# Patient Record
Sex: MALE | Race: WHITE | Employment: FULL TIME | URBAN - METROPOLITAN AREA
[De-identification: names, ages, dates, MRNs, and addresses within clinical notes are randomized per-mention and may not be internally consistent; named-entity substitution may affect disease eponyms.]

---

## 2017-01-02 ENCOUNTER — OFFICE VISIT (OUTPATIENT)
Dept: PHYSICAL THERAPY | Age: 47
End: 2017-01-02
Attending: INTERNAL MEDICINE
Payer: COMMERCIAL

## 2017-01-02 PROCEDURE — 97140 MANUAL THERAPY 1/> REGIONS: CPT

## 2017-01-02 PROCEDURE — 97110 THERAPEUTIC EXERCISES: CPT

## 2017-01-02 NOTE — PROGRESS NOTES
Dx: Myalgia (M79.1)           Authorized # of Visits:  8         Next MD visit: Jan 12  Fall Risk: standard         Precautions: n/a              Subjective: Reports increased muscle soreness all over past few days.   \"Feels like I was hit by a truck\"  Re stretch 3x 3x Wall reverse T 5x2 Piriformis, SKTC 3x each Full body extension stretch     Manual STM bilat UT, levator SNAGS for rot.  Manual  STM  UT release   Doorway stretch \"W\" and \"Y\" positions Ham 90/90 /neural flossing x10 Cervical manual distrac

## 2017-01-04 ENCOUNTER — OFFICE VISIT (OUTPATIENT)
Dept: PHYSICAL THERAPY | Age: 47
End: 2017-01-04
Attending: INTERNAL MEDICINE
Payer: COMMERCIAL

## 2017-01-04 PROCEDURE — 97110 THERAPEUTIC EXERCISES: CPT

## 2017-01-04 PROCEDURE — 97140 MANUAL THERAPY 1/> REGIONS: CPT

## 2017-01-05 NOTE — PROGRESS NOTES
Dx: Myalgia (M79.1)           Authorized # of Visits:  8         Next MD visit: Jan 12  Fall Risk: standard         Precautions: n/a              Subjective: Muscle soreness has been better since last visit.   Reports doing his stretches last night includin UT, levator SNAGS for rot. Manual  STM  UT release   Doorway stretch \"W\" and \"Y\" positions Ham 90/90 /neural flossing x10 Cervical manual distraction 5' int  -UT and rotation manual stretch Piriformis, trunk rot stretch 3x each.     4 point thoracic rot

## 2017-01-11 ENCOUNTER — OFFICE VISIT (OUTPATIENT)
Dept: PHYSICAL THERAPY | Age: 47
End: 2017-01-11
Attending: INTERNAL MEDICINE
Payer: COMMERCIAL

## 2017-01-11 PROCEDURE — 97110 THERAPEUTIC EXERCISES: CPT

## 2017-01-11 PROCEDURE — 97140 MANUAL THERAPY 1/> REGIONS: CPT

## 2017-01-11 NOTE — PROGRESS NOTES
Dx: Myalgia (M79.1)           Authorized # of Visits:  8         Next MD visit: Jan 12  Fall Risk: standard         Precautions: n/a            Progress /Discharge Summary    Pt has attended 8, cancelled 0, and no shown 0 visits in Physical Therapy.      Martinez turning. --> met but not consistently  Able to tolerate 45 min drive to work without stopping and able to immediately transfer from car upon arrival at work. --> in progress  Able to sleep through the night not waking more than 1x due to pain complaints. rotation with distraction Pt ed NS sensitivity, aerobic component and stretching program.  Lumbar rot mobe grade III 45\"x4 each side Prone lumbar PA's grade I,II  STM lumbar paraspinals Pelvic tilt- incr to 5 sec holds x15 HP cervical, T-L x 15'   Prayer

## 2017-02-02 PROBLEM — I10 ESSENTIAL HYPERTENSION: Status: ACTIVE | Noted: 2017-02-02

## 2017-02-08 PROBLEM — M62.838 SPASM OF MUSCLE: Status: ACTIVE | Noted: 2017-02-08

## 2017-02-08 PROBLEM — L90.5 SCAR CONDITION AND FIBROSIS OF SKIN: Status: ACTIVE | Noted: 2017-02-08

## 2017-02-20 PROBLEM — M51.26 PROTRUSION OF LUMBAR INTERVERTEBRAL DISC: Status: ACTIVE | Noted: 2017-02-20

## 2017-02-20 PROBLEM — M70.72: Status: ACTIVE | Noted: 2017-02-20

## 2017-02-20 PROCEDURE — 84153 ASSAY OF PSA TOTAL: CPT | Performed by: INTERNAL MEDICINE

## 2017-02-20 PROCEDURE — 36415 COLL VENOUS BLD VENIPUNCTURE: CPT | Performed by: INTERNAL MEDICINE

## 2017-02-20 PROCEDURE — 83550 IRON BINDING TEST: CPT | Performed by: INTERNAL MEDICINE

## 2017-02-20 PROCEDURE — 80050 GENERAL HEALTH PANEL: CPT | Performed by: INTERNAL MEDICINE

## 2017-02-20 PROCEDURE — 82728 ASSAY OF FERRITIN: CPT | Performed by: INTERNAL MEDICINE

## 2017-02-20 PROCEDURE — 83540 ASSAY OF IRON: CPT | Performed by: INTERNAL MEDICINE

## 2017-02-20 PROCEDURE — 80061 LIPID PANEL: CPT | Performed by: INTERNAL MEDICINE

## 2017-04-18 PROCEDURE — 88304 TISSUE EXAM BY PATHOLOGIST: CPT | Performed by: UROLOGY

## 2017-04-18 PROCEDURE — 88302 TISSUE EXAM BY PATHOLOGIST: CPT | Performed by: UROLOGY

## 2017-05-30 PROCEDURE — 84403 ASSAY OF TOTAL TESTOSTERONE: CPT | Performed by: PHYSICIAN ASSISTANT

## 2017-05-30 PROCEDURE — 36415 COLL VENOUS BLD VENIPUNCTURE: CPT | Performed by: PHYSICIAN ASSISTANT

## 2017-06-05 PROBLEM — M47.816 LUMBAR SPONDYLOSIS: Status: ACTIVE | Noted: 2017-06-05

## 2017-06-05 PROBLEM — M47.816 FACET ARTHROPATHY, LUMBAR: Status: ACTIVE | Noted: 2017-06-05

## 2017-06-28 PROCEDURE — 88305 TISSUE EXAM BY PATHOLOGIST: CPT | Performed by: UROLOGY

## 2017-06-28 PROCEDURE — 88312 SPECIAL STAINS GROUP 1: CPT | Performed by: UROLOGY

## 2017-07-27 PROBLEM — R53.83 FATIGUE: Status: ACTIVE | Noted: 2017-07-27

## 2017-07-27 PROCEDURE — 84403 ASSAY OF TOTAL TESTOSTERONE: CPT | Performed by: UROLOGY

## 2017-07-27 PROCEDURE — 36415 COLL VENOUS BLD VENIPUNCTURE: CPT | Performed by: UROLOGY

## 2017-08-02 PROBLEM — M79.18 MYOFASCIAL MUSCLE PAIN: Status: ACTIVE | Noted: 2017-08-02

## 2017-09-07 PROBLEM — M79.621 PAIN IN BOTH UPPER ARMS: Status: ACTIVE | Noted: 2017-09-07

## 2017-09-07 PROBLEM — M79.622 PAIN IN BOTH UPPER ARMS: Status: ACTIVE | Noted: 2017-09-07

## 2017-10-14 PROCEDURE — 84403 ASSAY OF TOTAL TESTOSTERONE: CPT | Performed by: PHYSICIAN ASSISTANT

## 2017-12-28 PROBLEM — M79.10 MYALGIA: Status: ACTIVE | Noted: 2017-12-28

## 2018-01-05 PROBLEM — R20.0 ARM NUMBNESS: Status: ACTIVE | Noted: 2018-01-05

## 2018-01-26 PROCEDURE — 81003 URINALYSIS AUTO W/O SCOPE: CPT | Performed by: INTERNAL MEDICINE

## 2018-01-26 PROCEDURE — 82607 VITAMIN B-12: CPT | Performed by: INTERNAL MEDICINE

## 2018-01-26 PROCEDURE — 84403 ASSAY OF TOTAL TESTOSTERONE: CPT | Performed by: UROLOGY

## 2018-02-12 PROBLEM — E66.811 OBESITY (BMI 30.0-34.9): Status: ACTIVE | Noted: 2018-02-12

## 2018-02-12 PROBLEM — E66.9 OBESITY (BMI 30.0-34.9): Status: ACTIVE | Noted: 2018-02-12

## 2018-04-03 PROCEDURE — 87086 URINE CULTURE/COLONY COUNT: CPT | Performed by: PHYSICIAN ASSISTANT

## 2018-04-06 ENCOUNTER — HOSPITAL ENCOUNTER (EMERGENCY)
Age: 48
Discharge: HOME | End: 2018-04-06
Attending: EMERGENCY MEDICINE
Payer: COMMERCIAL

## 2018-04-06 ENCOUNTER — APPOINTMENT (OUTPATIENT)
Dept: ULTRASOUND IMAGING | Age: 48
End: 2018-04-06
Attending: EMERGENCY MEDICINE
Payer: COMMERCIAL

## 2018-04-06 VITALS
OXYGEN SATURATION: 96 % | BODY MASS INDEX: 29.02 KG/M2 | RESPIRATION RATE: 18 BRPM | DIASTOLIC BLOOD PRESSURE: 57 MMHG | HEIGHT: 64 IN | SYSTOLIC BLOOD PRESSURE: 109 MMHG | HEART RATE: 101 BPM | WEIGHT: 170 LBS | TEMPERATURE: 98 F

## 2018-04-06 DIAGNOSIS — M79.604 LEG PAIN, ANTERIOR, RIGHT: Primary | ICD-10-CM

## 2018-04-06 PROCEDURE — 96375 TX/PRO/DX INJ NEW DRUG ADDON: CPT

## 2018-04-06 PROCEDURE — 99284 EMERGENCY DEPT VISIT MOD MDM: CPT

## 2018-04-06 PROCEDURE — 76882 US LMTD JT/FCL EVL NVASC XTR: CPT | Performed by: EMERGENCY MEDICINE

## 2018-04-06 PROCEDURE — 85610 PROTHROMBIN TIME: CPT | Performed by: EMERGENCY MEDICINE

## 2018-04-06 PROCEDURE — 85730 THROMBOPLASTIN TIME PARTIAL: CPT | Performed by: EMERGENCY MEDICINE

## 2018-04-06 PROCEDURE — 96374 THER/PROPH/DIAG INJ IV PUSH: CPT

## 2018-04-06 PROCEDURE — 85025 COMPLETE CBC W/AUTO DIFF WBC: CPT | Performed by: EMERGENCY MEDICINE

## 2018-04-06 PROCEDURE — 80053 COMPREHEN METABOLIC PANEL: CPT | Performed by: EMERGENCY MEDICINE

## 2018-04-06 RX ORDER — ETODOLAC 400 MG/1
400 TABLET, FILM COATED ORAL 3 TIMES DAILY PRN
Qty: 30 TABLET | Refills: 0 | Status: SHIPPED | OUTPATIENT
Start: 2018-04-06 | End: 2018-09-18 | Stop reason: ALTCHOICE

## 2018-04-06 RX ORDER — METHYLPREDNISOLONE 4 MG/1
TABLET ORAL
Qty: 1 PACKAGE | Refills: 0 | Status: SHIPPED | OUTPATIENT
Start: 2018-04-06 | End: 2018-04-11

## 2018-04-06 RX ORDER — LORAZEPAM 2 MG/ML
1 INJECTION INTRAMUSCULAR ONCE
Status: COMPLETED | OUTPATIENT
Start: 2018-04-06 | End: 2018-04-06

## 2018-04-06 RX ORDER — LIDOCAINE 50 MG/G
2 PATCH TOPICAL EVERY 24 HOURS
Qty: 30 PATCH | Refills: 0 | Status: SHIPPED | OUTPATIENT
Start: 2018-04-06 | End: 2018-09-18

## 2018-04-06 RX ORDER — KETOROLAC TROMETHAMINE 30 MG/ML
30 INJECTION, SOLUTION INTRAMUSCULAR; INTRAVENOUS ONCE
Status: COMPLETED | OUTPATIENT
Start: 2018-04-06 | End: 2018-04-06

## 2018-04-06 RX ORDER — ONDANSETRON 2 MG/ML
4 INJECTION INTRAMUSCULAR; INTRAVENOUS ONCE
Status: COMPLETED | OUTPATIENT
Start: 2018-04-06 | End: 2018-04-06

## 2018-04-06 RX ORDER — MORPHINE SULFATE 4 MG/ML
4 INJECTION, SOLUTION INTRAMUSCULAR; INTRAVENOUS ONCE
Status: COMPLETED | OUTPATIENT
Start: 2018-04-06 | End: 2018-04-06

## 2018-04-07 NOTE — ED INITIAL ASSESSMENT (HPI)
Patient states that he gave himself a shot of testosterone on Monday and is having pain and swelling to right upper thigh area.

## 2018-04-07 NOTE — ED NOTES
After receiving the medications, patient is unable to tolerate crutch walking. Patient states that he has had several knee procedures that he knows how to use crutches. Patient given a pair of crutches adjusted to his height.

## 2018-04-07 NOTE — ED PROVIDER NOTES
Patient Seen in: [facility] Emergency Department In [place]    History   Patient presents with:  Cellulitis (integumentary, infectious)    Stated Complaint: PAIN AND SWELLING TO RIGHT THIGH - STATES HAD AN \"SHOT\" ON MONDAY    HPI    28-year-old male pre 2015:  OTHER SURGICAL HISTORY      Comment: S/ P Radical left orchiectomy 2015 06/28/2017: OTHER SURGICAL HISTORY      Comment: Radical R orchiectomy 6/28/17  No date: REPAIR ING HERNIA,5+Y/O,REDUCIBL      Comment: s/p  ventral hernia repair  12/22/2016: Cardiovascular: Normal rate, regular rhythm, normal heart sounds and intact distal pulses. Pulmonary/Chest: Effort normal and breath sounds normal. No stridor. Musculoskeletal: He exhibits tenderness.         Legs:  Lymphadenopathy:     He has no cervi Patient IV established and baseline blood work obtained. He underwent an ultrasound. No abnormality was appreciated. He does not have an elevated white count. He was given Toradol and Ativan.   He states he had no improvement whatsoever although he did

## 2018-05-11 PROCEDURE — 84403 ASSAY OF TOTAL TESTOSTERONE: CPT | Performed by: PHYSICIAN ASSISTANT

## 2018-06-27 PROBLEM — M70.61 TROCHANTERIC BURSITIS OF RIGHT HIP: Status: ACTIVE | Noted: 2018-06-27

## 2018-06-27 PROBLEM — M75.51 BURSITIS OF RIGHT SHOULDER: Status: ACTIVE | Noted: 2018-06-27

## 2018-07-12 ENCOUNTER — OFFICE VISIT (OUTPATIENT)
Dept: PHYSICAL THERAPY | Age: 48
End: 2018-07-12
Attending: PHYSICAL MEDICINE & REHABILITATION
Payer: COMMERCIAL

## 2018-07-12 DIAGNOSIS — M79.10 MYALGIA: ICD-10-CM

## 2018-07-12 PROCEDURE — 97163 PT EVAL HIGH COMPLEX 45 MIN: CPT

## 2018-07-12 PROCEDURE — 97112 NEUROMUSCULAR REEDUCATION: CPT

## 2018-07-12 NOTE — PROGRESS NOTES
SPINE EVALUATION:   Referring Physician: Dr. Denise Garay  Diagnosis: Myalgia (M79.1)     Date of Service: 7/10/2018     PATIENT Mandi Woodard is a 51year old y/o male who presents to therapy today with complaints of tightness in UE and LE.  He include increase activity, flexibility, and decrease tension. Past medical history was reviewed with Prem.  Significant findings include depression, sleep apnea, anxiety, IBS, chronic pelvic pain, surgery x 2 L knee, abdominal hernia, radiofrequency ablat pain on right side    UE ROM: 140-140 flexion limited by pain in upper thoracic, 150 shoulder abd in pectorals  LE ROM: R hip flexion 105; L 110, R hip ER 75% pain at lateral hip, B hip IR pain at pubic bone with R restricted 75% ROm    Accessory motion: T Activity;  Electrical Stim;  Pt education; Home exercise program instruction    Education or treatment limitation: None  Rehab Potential:good    FOTO: 42/100      Patient was advised of these findings, precautions, and treatment options and has agreed to Herbert Monterroso

## 2018-07-17 ENCOUNTER — OFFICE VISIT (OUTPATIENT)
Dept: PHYSICAL THERAPY | Age: 48
End: 2018-07-17
Attending: PHYSICAL MEDICINE & REHABILITATION
Payer: COMMERCIAL

## 2018-07-17 DIAGNOSIS — M79.10 MYALGIA: ICD-10-CM

## 2018-07-17 PROCEDURE — 97110 THERAPEUTIC EXERCISES: CPT

## 2018-07-17 PROCEDURE — 97112 NEUROMUSCULAR REEDUCATION: CPT

## 2018-07-17 PROCEDURE — 97140 MANUAL THERAPY 1/> REGIONS: CPT

## 2018-07-17 NOTE — PROGRESS NOTES
Dx: Myalgia (M79.1)          Authorized # of Visits:  10 visits         Next MD visit: none scheduled  Fall Risk: standard         Precautions: n/a             Subjective: Patient report that he commonly gets spasms in middle of back.  Patient reports he is Patient will report full cervical ROM without pain limitation  Patient will be able to sit at ergonomic work desk with computer with 0-2/10 pain  Patient will demonstrate increase hip extension 15 degrees to improve femoral nerve mobility with gait.   Mayra Charges: Ray Hammador 15 in), Ex2 ( 25 min), MT ( 10 min)       Total Timed Treatment: 50 min  Total Treatment Time: 50 min       SPINE EVALUATION:   Referring Physician: Dr. Sydnee Farah  Diagnosis: Myalgia (M79.1)     Date of Service: 7/10/2018     PATIENT SUMMARY Current functional limitations include 40 minute at walk a day. PLF: Use to run, [employer] volleball and basketball  Pt goals include increase activity, flexibility, and decrease tension. Past medical history was reviewed with Prem.  Significant find Cervical ROM:   Flexion: WNL  Extension: WNL  Sidebending: R 30; L 30 contralateral pain with both motions  Rotation: R 100; L 50 pain on right side    UE ROM: 140-140 flexion limited by pain in upper thoracic, 150 shoulder abd in pectorals  LE ROM: R hip Frequency / Duration: Patient will be seen for 2 x/week or a total of 10 visits over a 90 day period. Treatment will include: Manual Therapy; Therapeutic Exercises; Neuromuscular Re-education; Therapeutic Activity;  Electrical Stim;  Pt education; Home exer

## 2018-07-19 ENCOUNTER — OFFICE VISIT (OUTPATIENT)
Dept: PHYSICAL THERAPY | Age: 48
End: 2018-07-19
Attending: PHYSICAL MEDICINE & REHABILITATION
Payer: COMMERCIAL

## 2018-07-19 DIAGNOSIS — M79.10 MYALGIA: ICD-10-CM

## 2018-07-19 PROCEDURE — 97110 THERAPEUTIC EXERCISES: CPT

## 2018-07-19 PROCEDURE — 97140 MANUAL THERAPY 1/> REGIONS: CPT

## 2018-07-19 NOTE — PROGRESS NOTES
Dx: Myalgia (M79.1)          Authorized # of Visits:  10 visits         Next MD visit: none scheduled  Fall Risk: standard         Precautions: n/a           15 minutes late for appointment  Subjective: Patient reports no back spasm today.  He wants to addr Upper thoracic and ribs PA GIV L>R restriction        Cervical accessory assessment    GIV down glides C6/7 - C7/T1         PA middle thoracic and ribs GIV - V  Upper thoracic and ribs PA GIV L>R restriction Manual UT and LS stretch 30 sec x 5        Manua Ramonita Faria is a 51year old y/o male who presents to therapy today with complaints of tightness in UE and LE. He reports he has had a long history of chronic pain that started after inguinal hernia repair.  He began to have severe testicular pain that Past medical history was reviewed with Prem.  Significant findings include depression, sleep apnea, anxiety, IBS, chronic pelvic pain, surgery x 2 L knee, abdominal hernia, radiofrequency ablation L5/S1 x2  Last one was in June, B orchiectomy, Iguinal pramod UE ROM: 140-140 flexion limited by pain in upper thoracic, 150 shoulder abd in pectorals  LE ROM: R hip flexion 105; L 110, R hip ER 75% pain at lateral hip, B hip IR pain at pubic bone with R restricted 75% ROm    Accessory motion:  To be tested  Palpation Frequency / Duration: Patient will be seen for 2 x/week or a total of 10 visits over a 90 day period. Treatment will include: Manual Therapy; Therapeutic Exercises; Neuromuscular Re-education; Therapeutic Activity;  Electrical Stim;  Pt education; Home exer

## 2018-07-31 ENCOUNTER — OFFICE VISIT (OUTPATIENT)
Dept: PHYSICAL THERAPY | Age: 48
End: 2018-07-31
Attending: PHYSICAL MEDICINE & REHABILITATION
Payer: COMMERCIAL

## 2018-07-31 DIAGNOSIS — M79.10 MYALGIA: ICD-10-CM

## 2018-07-31 PROCEDURE — 97140 MANUAL THERAPY 1/> REGIONS: CPT

## 2018-07-31 PROCEDURE — 97110 THERAPEUTIC EXERCISES: CPT

## 2018-07-31 NOTE — PROGRESS NOTES
Dx: Myalgia (M79.1)          Authorized # of Visits:  10 visits         Next MD visit: none scheduled  Fall Risk: standard         Precautions: n/a           15 minutes late for appointment  Subjective: Return from one week of vacation.  Remained active at Upper thoracic and ribs PA GIV L>R restriction Hip flexor stretch and 30 sec x 3 B       Cervical accessory assessment    GIV down glides C6/7 - C7/T1         PA middle thoracic and ribs GIV - V  Upper thoracic and ribs PA GIV L>R restriction Manual UT and Sabino Blanton is a 51year old y/o male who presents to therapy today with complaints of tightness in UE and LE. He reports he has had a long history of chronic pain that started after inguinal hernia repair.  He began to have severe testicular pain that Past medical history was reviewed with Prem.  Significant findings include depression, sleep apnea, anxiety, IBS, chronic pelvic pain, surgery x 2 L knee, abdominal hernia, radiofrequency ablation L5/S1 x2  Last one was in June, B orchiectomy, Iguinal pramod UE ROM: 140-140 flexion limited by pain in upper thoracic, 150 shoulder abd in pectorals  LE ROM: R hip flexion 105; L 110, R hip ER 75% pain at lateral hip, B hip IR pain at pubic bone with R restricted 75% ROm    Accessory motion:  To be tested  Palpation Frequency / Duration: Patient will be seen for 2 x/week or a total of 10 visits over a 90 day period. Treatment will include: Manual Therapy; Therapeutic Exercises; Neuromuscular Re-education; Therapeutic Activity;  Electrical Stim;  Pt education; Home exer

## 2018-08-02 ENCOUNTER — OFFICE VISIT (OUTPATIENT)
Dept: PHYSICAL THERAPY | Age: 48
End: 2018-08-02
Attending: PHYSICAL MEDICINE & REHABILITATION
Payer: COMMERCIAL

## 2018-08-02 DIAGNOSIS — M79.10 MYALGIA: ICD-10-CM

## 2018-08-02 PROCEDURE — 97140 MANUAL THERAPY 1/> REGIONS: CPT

## 2018-08-02 PROCEDURE — 97110 THERAPEUTIC EXERCISES: CPT

## 2018-08-02 NOTE — PROGRESS NOTES
Dx: Myalgia (M79.1)          Authorized # of Visits:  10 visits         Next MD visit: none scheduled  Fall Risk: standard         Precautions: n/a           15 minutes late for appointment  Subjective: Patient reports that he is trying to remain active wi Patient will report 50% less pelvic and gluteal pain after 30 minutes of standing    Plan: Continue PT to increase cervical ROM and thoracic mobility. Provide stretches to help patient receive \"tension\" symptom relief.  Address nerve glides and LE stretch Pt has calm sharon.  Discuss importance on addressing mindfulness to help with sleep and FMS goals Supine PNF D2 green 2 x 10 B  Sidelying stretch PB 30 sec x 3  Address QL      TNE  [facility] pain center also regulates sleep and emotion  Discuss the central role in Currently, he is most concerned about tightness and tension he experiences through pectorals, neck, TMJ, and piriformis. He recently received injection in R shoulder and hip for bursitis and has improved ROM with less pain.  Patient also had reduction of pa Mr. Sunday Felipe has a long history of chronic pain and centralized patterns. He has sleep disturbance, frequent pain sources and manages depression which correlated with FMS symptoms he is experiencing.  He has benefited from PT and stretches in the past. He l Rhomboids: R 3+/5, L 3+/5  Mid trap: R 3+/5; L 3+/5  Lats: R 4/5, L 4/5  Low trap: R 3/5; L 3/5   4+-5/5 B       Flexibility:   UE/Scapular LE   Upper Trap: R restricted; L restricted  Levator Scap: R restricted; L restricted  Pec Major:   WNL with tightnes Thank you for your referral. Please co-sign or sign and return this letter via fax as soon as possible to [phone].  If you have any questions, please contact me at Dept: [phone]    Sincerely,  Electronically signed by therapist: Malcom Murray PT

## 2018-08-03 PROCEDURE — 84403 ASSAY OF TOTAL TESTOSTERONE: CPT | Performed by: PHYSICIAN ASSISTANT

## 2018-08-03 PROCEDURE — 36415 COLL VENOUS BLD VENIPUNCTURE: CPT | Performed by: PHYSICIAN ASSISTANT

## 2018-08-03 PROCEDURE — 84402 ASSAY OF FREE TESTOSTERONE: CPT | Performed by: PHYSICIAN ASSISTANT

## 2018-08-07 ENCOUNTER — TELEPHONE (OUTPATIENT)
Dept: PHYSICAL THERAPY | Age: 48
End: 2018-08-07

## 2018-08-07 ENCOUNTER — APPOINTMENT (OUTPATIENT)
Dept: PHYSICAL THERAPY | Age: 48
End: 2018-08-07
Attending: PHYSICAL MEDICINE & REHABILITATION
Payer: COMMERCIAL

## 2018-08-09 ENCOUNTER — OFFICE VISIT (OUTPATIENT)
Dept: PHYSICAL THERAPY | Age: 48
End: 2018-08-09
Attending: PHYSICAL MEDICINE & REHABILITATION
Payer: COMMERCIAL

## 2018-08-09 PROCEDURE — 97110 THERAPEUTIC EXERCISES: CPT

## 2018-08-09 PROCEDURE — 97140 MANUAL THERAPY 1/> REGIONS: CPT

## 2018-08-09 NOTE — PROGRESS NOTES
Dx: Myalgia (M79.1)          Authorized # of Visits:  10 visits         Next MD visit: none scheduled  Fall Risk: standard         Precautions: n/a           15 minutes late for appointment  Subjective: Patient some \"thoracic/lumbar region\" pain after si Side stepping 2 min B with posture cues    Backward walking 3 min 0.9 mph for nerve glide     Thoracic half roll 5 min with pectoral stretch  Advise patient to give pectorals a break and no longer than 30 sec  PA middle thoracic and ribs GIV - V  Upper tho Skilled Services: To provide skilled guidance of therapeutic exercise for both stretching and strengthening combined with TNE and resources for managing sleep and emotion to help with pain.  To address nerve glides and hip flexor mobility to assist with pel Currently, he is most concerned about tightness and tension he experiences through pectorals, neck, TMJ, and piriformis. He recently received injection in R shoulder and hip for bursitis and has improved ROM with less pain.  Patient also had reduction of pa Mr. Stacey Gamboa has a long history of chronic pain and centralized patterns. He has sleep disturbance, frequent pain sources and manages depression which correlated with FMS symptoms he is experiencing.  He has benefited from PT and stretches in the past. He l Rhomboids: R 3+/5, L 3+/5  Mid trap: R 3+/5; L 3+/5  Lats: R 4/5, L 4/5  Low trap: R 3/5; L 3/5   4+-5/5 B       Flexibility:   UE/Scapular LE   Upper Trap: R restricted; L restricted  Levator Scap: R restricted; L restricted  Pec Major:   WNL with tightnes Thank you for your referral. Please co-sign or sign and return this letter via fax as soon as possible to [phone].  If you have any questions, please contact me at Dept: [phone]    Sincerely,  Electronically signed by therapist: Malcom Murray PT

## 2018-08-13 ENCOUNTER — OFFICE VISIT (OUTPATIENT)
Dept: PHYSICAL THERAPY | Age: 48
End: 2018-08-13
Attending: INTERNAL MEDICINE
Payer: COMMERCIAL

## 2018-08-13 PROCEDURE — 97140 MANUAL THERAPY 1/> REGIONS: CPT

## 2018-08-13 PROCEDURE — 97110 THERAPEUTIC EXERCISES: CPT

## 2018-08-13 NOTE — PROGRESS NOTES
Dx: Myalgia (M79.1)          Authorized # of Visits:  10 visits         Next MD visit: none scheduled  Fall Risk: standard         Precautions: n/a             Subjective: Neck and upper back has improved at closer to 0/10.  Pelvis, pubic bone and piriformi Discuss past HEP from [facility]. Hip flexor stretch and 30 sec x 3 B TM 5 min with arm swing 1.8 mph    Review subjective hx. Review HEP TM 5 min with arm swing 2.0 mph      Review subjective hx.    Review HEP TM 2.0 3 min  Side stepping 2 min B with posture Pt has calm sharon.  Discuss importance on addressing mindfulness to help with sleep and FMS goals Supine PNF D2 green 2 x 10 B  Sidelying stretch PB 30 sec x 3  Address QL  Instruct self stretch for home    TNE  [facility] pain center also regulates sleep and emotio Currently, he is most concerned about tightness and tension he experiences through pectorals, neck, TMJ, and piriformis. He recently received injection in R shoulder and hip for bursitis and has improved ROM with less pain.  Patient also had reduction of pa Mr. Akash Camarena has a long history of chronic pain and centralized patterns. He has sleep disturbance, frequent pain sources and manages depression which correlated with FMS symptoms he is experiencing.  He has benefited from PT and stretches in the past. He l Rhomboids: R 3+/5, L 3+/5  Mid trap: R 3+/5; L 3+/5  Lats: R 4/5, L 4/5  Low trap: R 3/5; L 3/5   4+-5/5 B       Flexibility:   UE/Scapular LE   Upper Trap: R restricted; L restricted  Levator Scap: R restricted; L restricted  Pec Major:   WNL with tightnes Thank you for your referral. Please co-sign or sign and return this letter via fax as soon as possible to [phone].  If you have any questions, please contact me at Dept: [phone]    Sincerely,  Electronically signed by therapist: Cricket Welch PT

## 2018-08-14 ENCOUNTER — APPOINTMENT (OUTPATIENT)
Dept: PHYSICAL THERAPY | Age: 48
End: 2018-08-14
Attending: PHYSICAL MEDICINE & REHABILITATION
Payer: COMMERCIAL

## 2018-08-16 ENCOUNTER — OFFICE VISIT (OUTPATIENT)
Dept: PHYSICAL THERAPY | Age: 48
End: 2018-08-16
Attending: PHYSICAL MEDICINE & REHABILITATION
Payer: COMMERCIAL

## 2018-08-16 PROCEDURE — 97110 THERAPEUTIC EXERCISES: CPT

## 2018-08-16 PROCEDURE — 97140 MANUAL THERAPY 1/> REGIONS: CPT

## 2018-08-16 NOTE — PROGRESS NOTES
Dx: Myalgia (M79.1)          Authorized # of Visits:  10 visits         Next MD visit: none scheduled  Fall Risk: standard         Precautions: n/a             Subjective: Neck stiffness on left side.  Patient thinks it is from turning head frequently to th Discuss past HEP from [facility]. Hip flexor stretch and 30 sec x 3 B TM 5 min with arm swing 1.8 mph    Review subjective hx. Review HEP TM 5 min with arm swing 2.0 mph      Review subjective hx.    Review HEP TM 2.0 3 min  Side stepping 2 min B with posture Instruct self stretch Supine horizontal abd red 2 x 10      PB  \"t\" 2 x 10  \"Y\" 2 x 10  Lumbar ROLL L5/S1 release with positioning R and L KT tape UT inhibition and posture cue tape.     TNE  Provide information about community talk for Mindfulness with Abdon Jorge is a 51year old y/o male who presents to therapy today with complaints of tightness in UE and LE. He reports he has had a long history of chronic pain that started after inguinal hernia repair.  He began to have severe testicular pain that Past medical history was reviewed with Prem.  Significant findings include depression, sleep apnea, anxiety, IBS, chronic pelvic pain, surgery x 2 L knee, abdominal hernia, radiofrequency ablation L5/S1 x2  Last one was in June, B orchiectomy, Iguinal pramod UE ROM: 140-140 flexion limited by pain in upper thoracic, 150 shoulder abd in pectorals  LE ROM: R hip flexion 105; L 110, R hip ER 75% pain at lateral hip, B hip IR pain at pubic bone with R restricted 75% ROm    Accessory motion:  To be tested  Palpation Frequency / Duration: Patient will be seen for 2 x/week or a total of 10 visits over a 90 day period. Treatment will include: Manual Therapy; Therapeutic Exercises; Neuromuscular Re-education; Therapeutic Activity;  Electrical Stim;  Pt education; Home exer

## 2018-08-21 ENCOUNTER — OFFICE VISIT (OUTPATIENT)
Dept: PHYSICAL THERAPY | Age: 48
End: 2018-08-21
Attending: PHYSICAL MEDICINE & REHABILITATION
Payer: COMMERCIAL

## 2018-08-21 PROCEDURE — 97110 THERAPEUTIC EXERCISES: CPT

## 2018-08-21 PROCEDURE — 97140 MANUAL THERAPY 1/> REGIONS: CPT

## 2018-08-21 NOTE — PROGRESS NOTES
Dx: Myalgia (M79.1)          Authorized # of Visits:  10 visits         Next MD visit: none scheduled  Fall Risk: standard         Precautions: n/a             Subjective: Soreness in neck after new therapy techniques. Better today.  Muscle soreness in uppe TM 5 min with arm swing 1.8 mph  Discuss past HEP from [facility]. Hip flexor stretch and 30 sec x 3 B TM 5 min with arm swing 1.8 mph    Review subjective hx. Review HEP TM 5 min with arm swing 2.0 mph      Review subjective hx.    Review HEP TM 2.0 3 min  Si Manual UT and LS stretch 30 sec x 5 STMinterscapular, upper thoracic, levator scapula and cervical p/s and suboccipital muscles  MT piriformis B with light to deep 10 min PB TrA Alternate LE raises x 10  Issue black band for home  LS stretching 30 sec x 4 Sabino Blanton is a 51year old y/o male who presents to therapy today with complaints of tightness in UE and LE. He reports he has had a long history of chronic pain that started after inguinal hernia repair.  He began to have severe testicular pain that Past medical history was reviewed with Prem.  Significant findings include depression, sleep apnea, anxiety, IBS, chronic pelvic pain, surgery x 2 L knee, abdominal hernia, radiofrequency ablation L5/S1 x2  Last one was in June, B orchiectomy, Iguinal pramod UE ROM: 140-140 flexion limited by pain in upper thoracic, 150 shoulder abd in pectorals  LE ROM: R hip flexion 105; L 110, R hip ER 75% pain at lateral hip, B hip IR pain at pubic bone with R restricted 75% ROm    Accessory motion:  To be tested  Palpation Frequency / Duration: Patient will be seen for 2 x/week or a total of 10 visits over a 90 day period. Treatment will include: Manual Therapy; Therapeutic Exercises; Neuromuscular Re-education; Therapeutic Activity;  Electrical Stim;  Pt education; Home exer

## 2018-08-23 ENCOUNTER — OFFICE VISIT (OUTPATIENT)
Dept: PHYSICAL THERAPY | Age: 48
End: 2018-08-23
Attending: PHYSICAL MEDICINE & REHABILITATION
Payer: COMMERCIAL

## 2018-08-23 PROCEDURE — 97110 THERAPEUTIC EXERCISES: CPT

## 2018-08-23 PROCEDURE — 97140 MANUAL THERAPY 1/> REGIONS: CPT

## 2018-08-23 NOTE — PROGRESS NOTES
Dx: Myalgia (M79.1)          Authorized # of Visits:  10 visits         Next MD visit: none scheduled  Fall Risk: standard         Precautions: n/a             Subjective: Patient was asked if he had residual neck pain after therapy.  He stated\" the neck i Date:   07/31/2018            TX#: 4/ Date:  08/02/2018             TX#: 5/ Date: 08/09/2018              TX#: 6/ Date:  08/13/2018             TX#: 7/ Date: 08/16/2018               TX#: 8/ Date: 08/21/2018  Tx 9 Date 08/23/2018  Tx 10   TM 5 min with ar Upper thoracic and ribs PA GIV L>R restriction Manual UT and LS stretch 30 sec x 5 STMinterscapular, upper thoracic, levator scapula and cervical p/s and suboccipital muscles 15 min  PB TrA    Alternate UE raises x 10 KT taping  For UT inhibition and scapu Skilled Services: To provide skilled guidance of therapeutic exercise for both stretching and strengthening combined with TNE and resources for managing sleep and emotion to help with pain.  To address nerve glides and hip flexor mobility to assist with pel Currently, he is most concerned about tightness and tension he experiences through pectorals, neck, TMJ, and piriformis. He recently received injection in R shoulder and hip for bursitis and has improved ROM with less pain.  Patient also had reduction of pa Mr. Martita Palomo has a long history of chronic pain and centralized patterns. He has sleep disturbance, frequent pain sources and manages depression which correlated with FMS symptoms he is experiencing.  He has benefited from PT and stretches in the past. He l Rhomboids: R 3+/5, L 3+/5  Mid trap: R 3+/5; L 3+/5  Lats: R 4/5, L 4/5  Low trap: R 3/5; L 3/5   4+-5/5 B       Flexibility:   UE/Scapular LE   Upper Trap: R restricted; L restricted  Levator Scap: R restricted; L restricted  Pec Major:   WNL with tightnes Thank you for your referral. Please co-sign or sign and return this letter via fax as soon as possible to [phone].  If you have any questions, please contact me at Dept: [phone]    Sincerely,  Electronically signed by therapist: Eboni Rodríguez PT

## 2018-08-28 ENCOUNTER — OFFICE VISIT (OUTPATIENT)
Dept: PHYSICAL THERAPY | Age: 48
End: 2018-08-28
Attending: PHYSICAL MEDICINE & REHABILITATION
Payer: COMMERCIAL

## 2018-08-28 PROCEDURE — 97110 THERAPEUTIC EXERCISES: CPT

## 2018-08-28 PROCEDURE — 97140 MANUAL THERAPY 1/> REGIONS: CPT

## 2018-08-28 NOTE — PROGRESS NOTES
Discharge Summary    Dx: Myalgia (M79.1)          Authorized # of Visits:  10 visits         Next MD visit: none scheduled  Fall Risk: standard         Precautions: n/a            Pt has attended 11, cancelled 1(traffic), and no shown 0 visits in Physical Patient will report 50% less pelvic and gluteal pain after 30 minutes of standing      Plan:  Discharge to continue with HEP    Patient was advised of these findings, precautions, and treatment options and has agreed to actively participate in planning and x10 1 lb  T  X 10 no weight  X 10 1 lb  Lats   x10 no weight  X 10 2 lbs  Rows   2 x 10 2 lbs Thoracic stretch on PB 30 sec x 5    Femoral nerve glide in floor lunge position. Foot is stationary in PF position. Add contralateral SB as last movement.   Bahman Alvarenga Skilled Services: To provide skilled guidance of therapeutic exercise for both stretching and strengthening combined with TNE and resources for managing sleep and emotion to help with pain.  To address nerve glides and hip flexor mobility to assist with pel Currently, he is most concerned about tightness and tension he experiences through pectorals, neck, TMJ, and piriformis. He recently received injection in R shoulder and hip for bursitis and has improved ROM with less pain.  Patient also had reduction of pa Mr. Akash Camarena has a long history of chronic pain and centralized patterns. He has sleep disturbance, frequent pain sources and manages depression which correlated with FMS symptoms he is experiencing.  He has benefited from PT and stretches in the past. He l Rhomboids: R 3+/5, L 3+/5  Mid trap: R 3+/5; L 3+/5  Lats: R 4/5, L 4/5  Low trap: R 3/5; L 3/5   4+-5/5 B       Flexibility:   UE/Scapular LE   Upper Trap: R restricted; L restricted  Levator Scap: R restricted; L restricted  Pec Major:   WNL with tightnes Thank you for your referral. Please co-sign or sign and return this letter via fax as soon as possible to [phone].  If you have any questions, please contact me at Dept: [phone]    Sincerely,  Electronically signed by therapist: Savita Burt PT

## 2018-08-30 ENCOUNTER — APPOINTMENT (OUTPATIENT)
Dept: PHYSICAL THERAPY | Age: 48
End: 2018-08-30
Attending: PHYSICAL MEDICINE & REHABILITATION
Payer: COMMERCIAL

## 2019-01-04 ENCOUNTER — HOSPITAL ENCOUNTER (OUTPATIENT)
Dept: GENERAL RADIOLOGY | Facility: HOSPITAL | Age: 49
Discharge: HOME | End: 2019-01-04
Attending: PHYSICAL MEDICINE & REHABILITATION
Payer: COMMERCIAL

## 2019-01-04 DIAGNOSIS — R07.81 RIB PAIN ON RIGHT SIDE: ICD-10-CM

## 2019-01-04 DIAGNOSIS — M54.6 THORACIC SPINE PAIN: ICD-10-CM

## 2019-01-04 DIAGNOSIS — R07.89 PAIN OF STERNUM: ICD-10-CM

## 2019-01-04 PROCEDURE — 72072 X-RAY EXAM THORAC SPINE 3VWS: CPT | Performed by: PHYSICAL MEDICINE & REHABILITATION

## 2019-01-04 PROCEDURE — 71120 X-RAY EXAM BREASTBONE 2/>VWS: CPT | Performed by: PHYSICAL MEDICINE & REHABILITATION

## 2019-01-04 PROCEDURE — 71100 X-RAY EXAM RIBS UNI 2 VIEWS: CPT | Performed by: PHYSICAL MEDICINE & REHABILITATION

## 2019-01-30 PROBLEM — M77.12 LATERAL EPICONDYLITIS OF LEFT ELBOW: Status: ACTIVE | Noted: 2019-01-30

## 2019-02-11 PROBLEM — R20.0 ARM NUMBNESS: Status: RESOLVED | Noted: 2018-01-05 | Resolved: 2019-02-11

## 2019-02-23 ENCOUNTER — HOSPITAL ENCOUNTER (EMERGENCY)
Facility: HOSPITAL | Age: 49
Discharge: HOME | End: 2019-02-24
Attending: EMERGENCY MEDICINE
Payer: COMMERCIAL

## 2019-02-23 DIAGNOSIS — F32.A DEPRESSION, UNSPECIFIED DEPRESSION TYPE: Primary | ICD-10-CM

## 2019-02-23 LAB
ALBUMIN SERPL-MCNC: 4.1 G/DL (ref 3.4–5)
ALBUMIN/GLOB SERPL: 1.1 {RATIO} (ref 1–2)
ALP LIVER SERPL-CCNC: 83 U/L (ref 45–117)
ALT SERPL-CCNC: 42 U/L (ref 16–61)
ANION GAP SERPL CALC-SCNC: 7 MMOL/L (ref 0–18)
AST SERPL-CCNC: 28 U/L (ref 15–37)
BARBITURATES UR QL SCN: NEGATIVE
BASOPHILS # BLD AUTO: 0.02 X10(3) UL (ref 0–0.2)
BASOPHILS NFR BLD AUTO: 0.3 %
BILIRUB SERPL-MCNC: 0.4 MG/DL (ref 0.1–2)
BILIRUB UR QL STRIP.AUTO: NEGATIVE
BUN BLD-MCNC: 12 MG/DL (ref 7–18)
BUN/CREAT SERPL: 12.9 (ref 10–20)
CALCIUM BLD-MCNC: 8.5 MG/DL (ref 8.5–10.1)
CANNABINOIDS UR QL SCN: NEGATIVE
CHLORIDE SERPL-SCNC: 109 MMOL/L (ref 98–107)
CLARITY UR REFRACT.AUTO: CLEAR
CO2 SERPL-SCNC: 25 MMOL/L (ref 21–32)
COCAINE UR QL: NEGATIVE
COLOR UR AUTO: YELLOW
CREAT BLD-MCNC: 0.93 MG/DL (ref 0.7–1.3)
CREAT UR-SCNC: 52.1 MG/DL
DEPRECATED RDW RBC AUTO: 40.9 FL (ref 35.1–46.3)
EOSINOPHIL # BLD AUTO: 0.06 X10(3) UL (ref 0–0.7)
EOSINOPHIL NFR BLD AUTO: 1 %
ERYTHROCYTE [DISTWIDTH] IN BLOOD BY AUTOMATED COUNT: 13.2 % (ref 11–15)
ETHANOL SERPL-MCNC: 32 MG/DL (ref ?–3)
ETHANOL UR-MCNC: POSITIVE MG/DL
GLOBULIN PLAS-MCNC: 3.7 G/DL (ref 2.8–4.4)
GLUCOSE BLD-MCNC: 81 MG/DL (ref 70–99)
GLUCOSE UR STRIP.AUTO-MCNC: NEGATIVE MG/DL
HCT VFR BLD AUTO: 46.3 % (ref 39–53)
HGB BLD-MCNC: 16.5 G/DL (ref 13–17.5)
IMM GRANULOCYTES # BLD AUTO: 0.02 X10(3) UL (ref 0–1)
IMM GRANULOCYTES NFR BLD: 0.3 %
KETONES UR STRIP.AUTO-MCNC: NEGATIVE MG/DL
LEUKOCYTE ESTERASE UR QL STRIP.AUTO: NEGATIVE
LITHIUM SERPL-SCNC: <0.2 MMOL/L (ref 0.6–1.2)
LYMPHOCYTES # BLD AUTO: 1.27 X10(3) UL (ref 1–4)
LYMPHOCYTES NFR BLD AUTO: 21.5 %
M PROTEIN MFR SERPL ELPH: 7.8 G/DL (ref 6.4–8.2)
MCH RBC QN AUTO: 31 PG (ref 26–34)
MCHC RBC AUTO-ENTMCNC: 35.6 G/DL (ref 31–37)
MCV RBC AUTO: 87 FL (ref 80–100)
MONOCYTES # BLD AUTO: 0.49 X10(3) UL (ref 0.1–1)
MONOCYTES NFR BLD AUTO: 8.3 %
NEUTROPHILS # BLD AUTO: 4.06 X10 (3) UL (ref 1.5–7.7)
NEUTROPHILS # BLD AUTO: 4.06 X10(3) UL (ref 1.5–7.7)
NEUTROPHILS NFR BLD AUTO: 68.6 %
NITRITE UR QL STRIP.AUTO: NEGATIVE
OPIATES UR QL SCN: NEGATIVE
OSMOLALITY SERPL CALC.SUM OF ELEC: 291 MOSM/KG (ref 275–295)
PCP UR QL SCN: NEGATIVE
PH UR STRIP.AUTO: 7 [PH] (ref 4.5–8)
PLATELET # BLD AUTO: 189 10(3)UL (ref 150–450)
POTASSIUM SERPL-SCNC: 3.6 MMOL/L (ref 3.5–5.1)
PROT UR STRIP.AUTO-MCNC: NEGATIVE MG/DL
RBC # BLD AUTO: 5.32 X10(6)UL (ref 4.3–5.7)
RBC UR QL AUTO: NEGATIVE
SODIUM SERPL-SCNC: 141 MMOL/L (ref 136–145)
SP GR UR STRIP.AUTO: 1.01 (ref 1–1.03)
UROBILINOGEN UR STRIP.AUTO-MCNC: <2 MG/DL
WBC # BLD AUTO: 5.9 X10(3) UL (ref 4–11)

## 2019-02-23 PROCEDURE — 80178 ASSAY OF LITHIUM: CPT | Performed by: EMERGENCY MEDICINE

## 2019-02-23 PROCEDURE — 80320 DRUG SCREEN QUANTALCOHOLS: CPT | Performed by: EMERGENCY MEDICINE

## 2019-02-23 PROCEDURE — 85025 COMPLETE CBC W/AUTO DIFF WBC: CPT | Performed by: EMERGENCY MEDICINE

## 2019-02-23 PROCEDURE — 36415 COLL VENOUS BLD VENIPUNCTURE: CPT

## 2019-02-23 PROCEDURE — 80307 DRUG TEST PRSMV CHEM ANLYZR: CPT | Performed by: EMERGENCY MEDICINE

## 2019-02-23 PROCEDURE — 99284 EMERGENCY DEPT VISIT MOD MDM: CPT

## 2019-02-23 PROCEDURE — 80053 COMPREHEN METABOLIC PANEL: CPT | Performed by: EMERGENCY MEDICINE

## 2019-02-23 PROCEDURE — 81003 URINALYSIS AUTO W/O SCOPE: CPT | Performed by: EMERGENCY MEDICINE

## 2019-02-23 RX ORDER — LORAZEPAM 1 MG/1
1 TABLET ORAL ONCE
Status: COMPLETED | OUTPATIENT
Start: 2019-02-23 | End: 2019-02-23

## 2019-02-24 VITALS
SYSTOLIC BLOOD PRESSURE: 111 MMHG | HEIGHT: 63 IN | WEIGHT: 165 LBS | BODY MASS INDEX: 29.23 KG/M2 | DIASTOLIC BLOOD PRESSURE: 75 MMHG | RESPIRATION RATE: 16 BRPM | HEART RATE: 68 BPM | TEMPERATURE: 97 F | OXYGEN SATURATION: 99 %

## 2019-02-24 NOTE — ED NOTES
Per Dr. Sourav Singh no need to initiate seclusion. Pt is calm and cooperative. Wife at bedside. Pt's belongings were secured.

## 2019-02-24 NOTE — ED NOTES
Security dropped off pt's belongings. Pt and spouse verbalized understanding of LIS dischg poc. Pt home with steady gait with spouse.

## 2019-02-24 NOTE — ED PROVIDER NOTES
Patient Seen in: [facility] Emergency Department    History   Patient presents with:  Eval-P (psychiatric)    Stated Complaint: Ronda VILLALPANDO    She is a 50-year-old gentleman brought in for scheduled evaluation.   He has a history of depression and scrotal exploration with revision of right vasectomy and excision of prominent right appendix testicle 4/18/17   • OTHER SURGICAL HISTORY  2015    S/ P Radical left orchiectomy 2015    • OTHER SURGICAL HISTORY  06/28/2017    Radical R orchiectomy 6/28/17 Pulmonary/Chest: Effort normal. No respiratory distress. Abdominal: Soft. He exhibits no distension. There is no tenderness. Musculoskeletal: Normal range of motion. He exhibits no tenderness.    Neurological: He is alert and oriented to person, place URINALYSIS WITH CULTURE REFLEX   RAINBOW DRAW BLUE   RAINBOW DRAW LAVENDER   RAINBOW DRAW LIGHT GREEN   RAINBOW DRAW GOLD   CBC W/ DIFFERENTIAL                  MDM     Patient evaluated by [place]. Does not meet any criteria forPsychiatric hold.   H

## 2019-02-24 NOTE — ED NOTES
Pt agrees for his wife to take his [de-identified] and phone home. The remaining of pt's belonging were secured by security.

## 2019-02-24 NOTE — ED NOTES
Pt states he has thoughts of self-harming himself. Denies any SI/HI. Redness to left hand noted. Pt also states he cut his right thumb last week. Pt reports not eating or sleeping much for past week.

## 2019-02-24 NOTE — BH LEVEL OF CARE ASSESSMENT
Level of Care Assessment Note    General Questions  Why are you here?: Pt states \" I haven't been sleeping well, this week has been tough, so I fell asleep in the car\" Pt denies having any SI/Plan or intent to hamt himself   Precipitating Events: Pt was Per pt's wife, recently there were some events that lead pt and his wife to be seperated. Pt's wife asked pt to leave the house last week due to ongoing family and marital issues. Pt's wife needed space to think about their marrige.  Pt did not take that we you do any of these?: Within the last three months  Score -  OV: 3- Low Risk   Describe : Pt denies any current SI/Plan or intent to hamr himself   Is your experience of thoughts of dying by suicide: Frightening  Protective Factors: Pt states \"My family thumb  Area(s) of Body Injured:  Other (comment)  Describe Area(s) of Body Injured: hand and thumb  Frequency: Monthly  Describe Frequency: once a month or less for the past 6 months   Severity: Superficial  Describe Severity: superficial cut and scratch ma Current/Previous MH/CD Treatment  Recovery Support Groups: Denies Past History  History of Seclusion/Restraint: No    Alcohol Use  How often do you have a drink containing alcohol? : 1 time per month or less  Alcohol Use  Age at first use?: 1 Appearance  Characteristics: Appropriate clothing  Eye Contact: Direct  Psychomotor Behavior  Gait/Movement: Coordinated  Abnormal movements: None  Posture: Relaxed  Rate of Movement: Normal  Mood and Affect  Mood or Feelings: Calm;Content  Appropriateness sleep and feeling stressed. Pt states he wants his wife to take him back. Pt denies any HI. Pt states he self harmed one week ago the day when his wife asked him to leave the house. Pt states he cut his thumb with a can opener.  Pt has superficial cuts on h

## 2019-03-27 ENCOUNTER — OFFICE VISIT (OUTPATIENT)
Dept: FAMILY MEDICINE CLINIC | Facility: CLINIC | Age: 49
End: 2019-03-27
Payer: COMMERCIAL

## 2019-03-27 VITALS
HEART RATE: 88 BPM | WEIGHT: 162 LBS | TEMPERATURE: 98 F | DIASTOLIC BLOOD PRESSURE: 68 MMHG | HEIGHT: 63 IN | OXYGEN SATURATION: 98 % | RESPIRATION RATE: 16 BRPM | BODY MASS INDEX: 28.7 KG/M2 | SYSTOLIC BLOOD PRESSURE: 120 MMHG

## 2019-03-27 DIAGNOSIS — M79.10 SORE MUSCLES: Primary | ICD-10-CM

## 2019-03-27 PROCEDURE — 99202 OFFICE O/P NEW SF 15 MIN: CPT | Performed by: NURSE PRACTITIONER

## 2019-03-27 NOTE — PATIENT INSTRUCTIONS
Trigger Point Injection: Your Experience    Most trigger point injections are done in your healthcare provider's office. You may be told to stop taking certain medicines before the procedure. Bring test results with you, as instructed.   During the proced

## 2019-03-27 NOTE — PROGRESS NOTES
CHIEF COMPLAINT:   Possible skin infection. HPI:     Sabina Palmer is a 47year old male who presents with concerns of possible skin infection. Pt reports he had 1 trigger point injection on neck 2 days ago. Reports some tenderness/soreness at site. Milnacipran HCl (SAVELLA) 50 MG Oral Tab Take 50 mg by mouth every morning  mg nightly. Disp: 180 tablet Rfl: 1   Sildenafil Citrate 100 MG Oral Tab Take 1 tablet (100 mg total) by mouth as needed for Erectile Dysfunction.  Disp: 8 tablet Rfl: 11   T /68   Pulse 88   Temp 98 °F (36.7 °C) (Oral)   Resp 16   Ht 63\"   Wt 162 lb   SpO2 98%   BMI 28.70 kg/m²   GENERAL: well developed, well nourished,in no apparent distress  SKIN: Lesion(s): upper back.  no erythema, mild tenderness at trigger point in · Once the trigger point is found, it is injected. The injection may contain medicine, such as a local anesthetic, which numbs the area. Ask your healthcare provider  what kind of medicine he or she uses.   · If you have other trigger points, the process is

## 2019-04-17 PROCEDURE — 88304 TISSUE EXAM BY PATHOLOGIST: CPT | Performed by: ORTHOPAEDIC SURGERY

## 2019-04-18 PROBLEM — Z47.89 ORTHOPEDIC AFTERCARE: Status: ACTIVE | Noted: 2019-04-18

## 2019-04-24 ENCOUNTER — APPOINTMENT (OUTPATIENT)
Dept: ULTRASOUND IMAGING | Facility: HOSPITAL | Age: 49
End: 2019-04-24
Attending: EMERGENCY MEDICINE
Payer: COMMERCIAL

## 2019-04-24 ENCOUNTER — HOSPITAL ENCOUNTER (EMERGENCY)
Facility: HOSPITAL | Age: 49
Discharge: HOME | End: 2019-04-24
Attending: EMERGENCY MEDICINE
Payer: COMMERCIAL

## 2019-04-24 VITALS
HEIGHT: 63 IN | TEMPERATURE: 99 F | DIASTOLIC BLOOD PRESSURE: 89 MMHG | BODY MASS INDEX: 29.23 KG/M2 | HEART RATE: 72 BPM | OXYGEN SATURATION: 98 % | RESPIRATION RATE: 16 BRPM | SYSTOLIC BLOOD PRESSURE: 117 MMHG | WEIGHT: 165 LBS

## 2019-04-24 DIAGNOSIS — L03.116 CELLULITIS OF LEFT LOWER EXTREMITY: Primary | ICD-10-CM

## 2019-04-24 PROCEDURE — 85025 COMPLETE CBC W/AUTO DIFF WBC: CPT | Performed by: EMERGENCY MEDICINE

## 2019-04-24 PROCEDURE — 80053 COMPREHEN METABOLIC PANEL: CPT | Performed by: EMERGENCY MEDICINE

## 2019-04-24 PROCEDURE — 83605 ASSAY OF LACTIC ACID: CPT | Performed by: EMERGENCY MEDICINE

## 2019-04-24 PROCEDURE — 93971 EXTREMITY STUDY: CPT | Performed by: EMERGENCY MEDICINE

## 2019-04-24 PROCEDURE — 36415 COLL VENOUS BLD VENIPUNCTURE: CPT | Performed by: EMERGENCY MEDICINE

## 2019-04-24 PROCEDURE — 96365 THER/PROPH/DIAG IV INF INIT: CPT | Performed by: EMERGENCY MEDICINE

## 2019-04-24 PROCEDURE — 87040 BLOOD CULTURE FOR BACTERIA: CPT | Performed by: EMERGENCY MEDICINE

## 2019-04-24 PROCEDURE — 96375 TX/PRO/DX INJ NEW DRUG ADDON: CPT | Performed by: EMERGENCY MEDICINE

## 2019-04-24 PROCEDURE — 99284 EMERGENCY DEPT VISIT MOD MDM: CPT | Performed by: EMERGENCY MEDICINE

## 2019-04-24 PROCEDURE — 96361 HYDRATE IV INFUSION ADD-ON: CPT | Performed by: EMERGENCY MEDICINE

## 2019-04-24 RX ORDER — HYDROMORPHONE HYDROCHLORIDE 1 MG/ML
1 INJECTION, SOLUTION INTRAMUSCULAR; INTRAVENOUS; SUBCUTANEOUS EVERY 30 MIN PRN
Status: DISCONTINUED | OUTPATIENT
Start: 2019-04-24 | End: 2019-04-24

## 2019-04-24 RX ORDER — ACETAMINOPHEN AND CODEINE PHOSPHATE 300; 30 MG/1; MG/1
1-2 TABLET ORAL EVERY 6 HOURS PRN
Qty: 10 TABLET | Refills: 0 | Status: ON HOLD | OUTPATIENT
Start: 2019-04-24 | End: 2019-05-01

## 2019-04-24 RX ORDER — CEPHALEXIN 500 MG/1
500 CAPSULE ORAL 4 TIMES DAILY
Qty: 40 CAPSULE | Refills: 0 | Status: SHIPPED | OUTPATIENT
Start: 2019-04-24 | End: 2019-05-04

## 2019-04-24 RX ORDER — MORPHINE SULFATE 4 MG/ML
4 INJECTION, SOLUTION INTRAMUSCULAR; INTRAVENOUS ONCE
Status: COMPLETED | OUTPATIENT
Start: 2019-04-24 | End: 2019-04-24

## 2019-04-24 RX ORDER — KETOROLAC TROMETHAMINE 30 MG/ML
30 INJECTION, SOLUTION INTRAMUSCULAR; INTRAVENOUS ONCE
Status: COMPLETED | OUTPATIENT
Start: 2019-04-24 | End: 2019-04-24

## 2019-04-24 NOTE — ED INITIAL ASSESSMENT (HPI)
Pt reports removal of lipoma on LLE per Dr Nii Cornelius. Pt reports redness/swelling to incision for 5 days. Denies fever, chills. Nausea since yesterday.

## 2019-04-24 NOTE — ED PROVIDER NOTES
Patient Seen in: [facility] Emergency Department    History   Patient presents with:  Cellulitis (integumentary, infectious)    Stated Complaint: Cellulitis    HPI    Patient is a 53-year-old male who states that a long history of a lipoma to his le Performed by Amy Brambila MD at [address]   • ORCHIECTOMY       • OTHER SURGICAL HISTORY      neuroma   • OTHER SURGICAL HISTORY      left knee x 2   • OTHER SURGICAL HISTORY      S/P Right scrotal exploration with revision of rig bilaterally. CARDIOVASCULAR: + S1-S2, regular rate and rhythm, no murmurs. EXTREMITIES: Left lower extremity, knee for range of motion nontender, ankle no bony tenderness. Patient does have mild calf swelling compared to the right.   Mild calf tenderness diagnosis)    Disposition:  Discharge  4/24/2019  9:19 pm    Follow-up:  Reese Sepulveda MD  [address] [address]  [phone]    In 2 days          Medications Prescribed:  Current Discharge Medication List    START taking thes

## 2019-04-26 ENCOUNTER — HOSPITAL ENCOUNTER (INPATIENT)
Facility: HOSPITAL | Age: 49
LOS: 6 days | Discharge: HOME | DRG: 857 | End: 2019-05-02
Attending: EMERGENCY MEDICINE | Admitting: INTERNAL MEDICINE
Payer: COMMERCIAL

## 2019-04-26 ENCOUNTER — APPOINTMENT (OUTPATIENT)
Dept: CT IMAGING | Facility: HOSPITAL | Age: 49
DRG: 857 | End: 2019-04-26
Attending: PHYSICIAN ASSISTANT
Payer: COMMERCIAL

## 2019-04-26 DIAGNOSIS — L03.116 CELLULITIS OF LEFT LOWER EXTREMITY: Primary | ICD-10-CM

## 2019-04-26 DIAGNOSIS — L08.9 WOUND INFECTION: ICD-10-CM

## 2019-04-26 DIAGNOSIS — T14.8XXA WOUND INFECTION: ICD-10-CM

## 2019-04-26 PROCEDURE — 73701 CT LOWER EXTREMITY W/DYE: CPT | Performed by: PHYSICIAN ASSISTANT

## 2019-04-26 RX ORDER — MORPHINE SULFATE 4 MG/ML
4 INJECTION, SOLUTION INTRAMUSCULAR; INTRAVENOUS ONCE
Status: COMPLETED | OUTPATIENT
Start: 2019-04-26 | End: 2019-04-26

## 2019-04-26 RX ORDER — KETOROLAC TROMETHAMINE 30 MG/ML
30 INJECTION, SOLUTION INTRAMUSCULAR; INTRAVENOUS ONCE
Status: COMPLETED | OUTPATIENT
Start: 2019-04-26 | End: 2019-04-26

## 2019-04-26 RX ORDER — HYDROCODONE BITARTRATE AND ACETAMINOPHEN 5; 325 MG/1; MG/1
1 TABLET ORAL EVERY 6 HOURS PRN
Status: DISCONTINUED | OUTPATIENT
Start: 2019-04-26 | End: 2019-04-27

## 2019-04-26 RX ORDER — ACETAMINOPHEN 325 MG/1
650 TABLET ORAL EVERY 6 HOURS PRN
Status: DISCONTINUED | OUTPATIENT
Start: 2019-04-26 | End: 2019-05-02

## 2019-04-26 RX ORDER — CLINDAMYCIN PHOSPHATE 600 MG/50ML
600 INJECTION INTRAVENOUS ONCE
Status: DISCONTINUED | OUTPATIENT
Start: 2019-04-26 | End: 2019-04-26

## 2019-04-27 PROCEDURE — 5A09357 ASSISTANCE WITH RESPIRATORY VENTILATION, LESS THAN 24 CONSECUTIVE HOURS, CONTINUOUS POSITIVE AIRWAY PRESSURE: ICD-10-PCS | Performed by: HOSPITALIST

## 2019-04-27 RX ORDER — TOPIRAMATE 25 MG/1
100 TABLET ORAL NIGHTLY
Status: DISCONTINUED | OUTPATIENT
Start: 2019-04-27 | End: 2019-05-02

## 2019-04-27 RX ORDER — MIRTAZAPINE 15 MG/1
45 TABLET, FILM COATED ORAL NIGHTLY
Status: DISCONTINUED | OUTPATIENT
Start: 2019-04-27 | End: 2019-05-02

## 2019-04-27 RX ORDER — BUSPIRONE HYDROCHLORIDE 5 MG/1
10 TABLET ORAL 3 TIMES DAILY
Status: DISCONTINUED | OUTPATIENT
Start: 2019-04-27 | End: 2019-05-02

## 2019-04-27 RX ORDER — AMLODIPINE BESYLATE AND BENAZEPRIL HYDROCHLORIDE 10; 40 MG/1; MG/1
1 CAPSULE ORAL DAILY
Status: DISCONTINUED | OUTPATIENT
Start: 2019-04-27 | End: 2019-04-27 | Stop reason: SDUPTHER

## 2019-04-27 RX ORDER — DICYCLOMINE HYDROCHLORIDE 10 MG/1
10 CAPSULE ORAL
Status: DISCONTINUED | OUTPATIENT
Start: 2019-04-27 | End: 2019-05-02

## 2019-04-27 RX ORDER — PANTOPRAZOLE SODIUM 40 MG/1
40 TABLET, DELAYED RELEASE ORAL
Status: DISCONTINUED | OUTPATIENT
Start: 2019-04-28 | End: 2019-05-02

## 2019-04-27 RX ORDER — GABAPENTIN 300 MG/1
300 CAPSULE ORAL 3 TIMES DAILY
Status: DISCONTINUED | OUTPATIENT
Start: 2019-04-27 | End: 2019-05-02

## 2019-04-27 RX ORDER — TERAZOSIN 2 MG/1
2 CAPSULE ORAL NIGHTLY
Status: DISCONTINUED | OUTPATIENT
Start: 2019-04-27 | End: 2019-05-02

## 2019-04-27 RX ORDER — CYCLOBENZAPRINE HCL 10 MG
10 TABLET ORAL 3 TIMES DAILY
Status: DISCONTINUED | OUTPATIENT
Start: 2019-04-27 | End: 2019-05-02

## 2019-04-27 RX ORDER — ALPRAZOLAM 1 MG/1
1 TABLET ORAL 2 TIMES DAILY PRN
Status: DISCONTINUED | OUTPATIENT
Start: 2019-04-27 | End: 2019-05-02

## 2019-04-27 RX ORDER — DEXTROAMPHETAMINE SACCHARATE, AMPHETAMINE ASPARTATE, DEXTROAMPHETAMINE SULFATE AND AMPHETAMINE SULFATE 1.25; 1.25; 1.25; 1.25 MG/1; MG/1; MG/1; MG/1
5 TABLET ORAL
Status: DISCONTINUED | OUTPATIENT
Start: 2019-04-27 | End: 2019-05-02

## 2019-04-27 RX ORDER — HYDROCODONE BITARTRATE AND ACETAMINOPHEN 5; 325 MG/1; MG/1
1-2 TABLET ORAL EVERY 4 HOURS PRN
Status: DISCONTINUED | OUTPATIENT
Start: 2019-04-27 | End: 2019-05-02

## 2019-04-27 NOTE — PLAN OF CARE
Pt in bed. Ambulates well with min assist. Pain severe with bearing weight, some numbness to foot per patient. RA, VSS. Tele per DICK orders. Voiding well. NPO at midnight for I&D with Dr. Clyde Fleischer tm. Vanco IV. Will continue to monitor.  Tolerating norco well

## 2019-04-27 NOTE — PLAN OF CARE
Received via stretcher from ER. Left leg with incision from previous surgery on 4/17. Redness and swelling noted. Elevated on pillow. Text paged MD Dr. Galo Miguel, [facility] hospitalist notified to place orders if PTA medications need to be renewed.  No new ord

## 2019-04-27 NOTE — PROGRESS NOTES
Bernadette DO 50. Patient Status:  Observation    1970 MRN [id number]   [facility] 3SW-A Attending Khadra Niño DO   Hosp Day # 0 PCP Almaz Hanna MD         S:  Patient continues to have pain in the lower le

## 2019-04-27 NOTE — PROGRESS NOTES
[facility] Dosing Service    Initial Pharmacokinetic Consult for Vancomycin Dosing     Eric Aragon is a 47year old male who is being treated for cellulitis. Pharmacy has been asked to dose Vancomycin by Dr. Lashae Cervantes.     He is allergic to amitr Goal trough level 10-15 ug/mL. 3.  Will monitor SCr daily while on Vancomycin to assess renal function. Pharmacy will continue to follow him and adjust as necessary.     Toshia Betancur PharmMINE  4/26/2019  11:24 PM  [address] [place] [address] Extension: [phone]

## 2019-04-27 NOTE — CONSULTS
INFECTIOUS DISEASE CONSULTATION    Driscoll Back Patient Status:  Observation    1970 MRN [id number]   [facility] 3SW-A Attending Riley Chappell, [facility] Day # 0 PCP Lindsay Crowell PAIN MANAGEMENT   • ORCHIECTOMY       • OTHER SURGICAL HISTORY      neuroma   • OTHER SURGICAL HISTORY      left knee x 2   • OTHER SURGICAL HISTORY      S/P Right scrotal exploration with revision of right vasectomy and excision of prominent right appendi COMMENTS)  Pregabalin              MYALGIA, OTHER (SEE COMMENTS)    Comment:Insomnia  Statins                 MYALGIA    Comment:Involuntary limb movement and impotence  Zetia [Ezetimibe]       MYALGIA, OTHER (SEE COMMENTS)    Medications:    Current Facil diazepam (VALIUM) 10 MG Oral Tab Insert one-half or one full suppository rectally at bedtime q HS prn for chronic pelvic pain syndrome Disp: 30 tablet Rfl: 2   Prazosin HCl 1 MG Oral Cap TAKE 1 CAPSULE(1 MG) BY MOUTH EVERY NIGHT Disp: 30 capsule Rfl: 0 (four) hours as needed. Disp:  Rfl:    Lisdexamfetamine Dimesylate (VYVANSE) 30 MG Oral Cap Take 1 capsule (30 mg total) by mouth daily.  Disp: 30 capsule Rfl: 0   [START ON 5/1/2019] Lisdexamfetamine Dimesylate (VYVANSE) 30 MG Oral Cap Take 1 capsule (30 m this visit on 04/26/19. Imaging:  CT 4/26: fluid collection noted 1.9x1.2x 3.6 cm beneath wound.     Problem list reviewed:  Patient Active Problem List:     Chronic pain     Chronic pain disorder     Shoulder pain     DICK (obstructive sleep apnea)

## 2019-04-27 NOTE — CONSULTS
[facility]  4/26/2019   CHIEF COMPLAINT   Patient presents with:  Cellulitis (integumentary, infectious)       Radha Woodard is a 47year old male who is s/p left lower medial leg lipoma excision on 4/17/2019.  He was d [facility] Encounter).     Past Surgical History:   Past Surgical History:   Procedure Laterality Date   • COLONOSCOPY  10/12    normal; repeat 10 yrs   • ESOPHAGOGASTRODUODENOSCOPY, COLONOSCOPY, POSSIBLE BIOPSY, POSSIBLE POLYPECTOMY 84363, 87322 N/A 10/16/2 Not on file    Occupational History      Occupation: school employee    Tobacco Use      Smoking status: Never Smoker      Smokeless tobacco: Never Used    Substance and Sexual Activity      Alcohol use: Yes        Comment: 2-3 drinks per week      Drug us feel a fluctuance c/w abscess formation.      Motor: + EHL/FHL/GC+S    Sensory: +SPN/DPN/T     Vascular: +DP/PT with warm well perfused toes     Compartments: soft and compressible throughout     IMAGING   CT Leg - Pending     IMPRESSIONS   Rock Chough

## 2019-04-27 NOTE — H&P
[facility] Hospitalist H&P       CC: Patient presents with:  Cellulitis (integumentary, infectious)       PCP: Olimpia Hale MD    History of Present Illness: Patient is a 47year old male with PMH sig for HL, GERD and DICK on CPAP is here with redness javier normal   • LUMBAR FACET INJECTION OR MEDIAL BRANCH NERVE BLOCK Bilateral 5/14/2018    Performed by Carly Dillard MD at . [address] Bilateral 6/8/2018    Performed by Carly Dillard MD at [address] [place] [address]  impotence  Zetia [Ezetimibe]       MYALGIA, OTHER (SEE COMMENTS)     Home Medications:    Outpatient Medications Marked as Taking for the 4/26/19 encounter [facility] Encounter):  cephALEXin 500 MG Oral Cap Take 1 capsule (500 mg total) by mouth 4 (four) ti Tab Take 1 tablet (100 mg total) by mouth as needed for Erectile Dysfunction. Disp: 8 tablet Rfl: 11   Testosterone cypionate 200 MG/ML Intramuscular Solution Inject 200 mg into the muscle every 14 (fourteen) days.  Disp:  Rfl:    Amlodipine Besy-Benazepril tenderness or deformity. Heart:  Regular rate and rhythm, S1, S2 normal, no murmur, rub or gallop appreciated   Abdomen:   Soft, non-tender. Bowel sounds normal. No masses,  No organomegaly.  Non distended   Extremities: Extremities normal, atraumatic, no 3. 6 cm. There is a small skin defect to superficial to this. This may represent postsurgical seroma. No internal gas. Superimposed infection cannot be excluded. There is diffuse skin thickening and subcutaneous edema involving the mid to distal calf. cellulitis  -surgery notes low concern for abscess at this time  -cont IV abx (currently ancef and vanco); follow clinically with surgical input again today (NPO in case I&D)  -hsCRP elevated; WBC nml  -bcx pending  -LLE doppler neg for DVT on 4/24  -CT le

## 2019-04-27 NOTE — ED PROVIDER NOTES
Patient Seen in: [facility] Emergency Department    History   Patient presents with:  Cellulitis (integumentary, infectious)    Stated Complaint: post op cellulits    HPI  Patient is a 14-year-old male who states that he has a long history of a lipo Performed by Kecia Devi MD at [facility]   • ORCHIECTOMY       • OTHER SURGICAL HISTORY      neuroma   • OTHER SURGICAL HISTORY      left knee x 2   • OTHER SURGICAL HISTORY      S/P Right scrotal exploration with revision of ri GENERAL: Patient resting comfortably on the cart in no acute distress. HEENT: Extraocular muscles intact  LUNGS: Lungs clear to auscultation bilaterally. CARDIOVASCULAR: + S1-S2, regular rate and rhythm, no murmurs.   EXTREMITIES: Left lower extremity, kn Reviewed ultrasound completed on 4/24 which was negative for DVT  Ct Tibia/fibula Left(contrast Only) (cpt=73701)    Result Date: 4/26/2019  PROCEDURE:  CT TIBIA/FIBULA LEFT(CONTRAST ONLY) (CPT=73701)  COMPARISON:  None.   INDICATIONS:  post op cellulits PROCEDURE:  US VENOUS DOPPLER LEG LEFT - DIAG IMG (CPT=93971)  COMPARISON:  None. INDICATIONS:  Cellulitis  TECHNIQUE:  Real time, grey scale, and duplex ultrasound was used to evaluate the lower extremity venous system.  B-mode two-dimensional images of t Disposition and Plan     Clinical Impression:  Cellulitis of left lower extremity  (primary encounter diagnosis)    Disposition:  Admit  4/26/2019 10:30 pm    Follow-up:  Yue Schmitz MD  [address] [place] [address]

## 2019-04-27 NOTE — PLAN OF CARE
Received via stretcher from ER. Left leg with incision from previous surgery on 4/17. Redness and swelling noted. Elevated on pillow. Text paged MD Dr. Arina Panda, [facility] hospitalist notified to place orders if PTA medications need to be renewed.  No new ord

## 2019-04-27 NOTE — ED INITIAL ASSESSMENT (HPI)
Pt had lipoma removed from left calf on 4/17, noticed swelling and redness on Sunday. Pt was seen here on Wednesday and txt with abx.        Pt states today it started draining

## 2019-04-27 NOTE — PROGRESS NOTES
[facility] Dosing Service  Antibiotic Dosing    Desiree Hill is a 47year old male for whom pharmacy is dosing cefazolin for treatment of  cellulitis.      Allergies: is allergic to amitriptyline; cymbalta [duloxetine hcl]; effexor [venlafaxine]; eryt

## 2019-04-27 NOTE — RESPIRATORY THERAPY NOTE
DICK : EQUIPMENT USE: DAILY SUMMARY                                            SET MODE: CPAP                                          USAGE IN HOURS: 2:53                                          90% EXP. PRESSURE(E

## 2019-04-28 RX ORDER — DOCUSATE SODIUM 100 MG/1
100 CAPSULE, LIQUID FILLED ORAL 2 TIMES DAILY
Status: DISCONTINUED | OUTPATIENT
Start: 2019-04-28 | End: 2019-05-02

## 2019-04-28 RX ORDER — SODIUM PHOSPHATE, DIBASIC AND SODIUM PHOSPHATE, MONOBASIC 7; 19 G/133ML; G/133ML
1 ENEMA RECTAL ONCE AS NEEDED
Status: ACTIVE | OUTPATIENT
Start: 2019-04-28 | End: 2019-04-28

## 2019-04-28 RX ORDER — POLYETHYLENE GLYCOL 3350 17 G/17G
17 POWDER, FOR SOLUTION ORAL DAILY PRN
Status: DISCONTINUED | OUTPATIENT
Start: 2019-04-28 | End: 2019-05-02

## 2019-04-28 RX ORDER — BISACODYL 10 MG
10 SUPPOSITORY, RECTAL RECTAL
Status: DISCONTINUED | OUTPATIENT
Start: 2019-04-28 | End: 2019-05-02

## 2019-04-28 NOTE — PROGRESS NOTES
[facility] dosing service    Follow-up Pharmacokinetic Consult for Vancomycin Dosing     Dexter Stauffer is a 47year old male who is being treated for surgical wound cellulitis and possible abscess.    Patient is on day 3 of Vancomycin and add is cecy PharmD  4/28/2019  11:19 AM  [address] [facility] Extension: [phone]

## 2019-04-28 NOTE — PROGRESS NOTES
[facility] Hospitalist Progress Note     PCP: Rylie Arzola MD    SUBJECTIVE:  No CP, SOB, or N/V. Feels achy today. No respiratory symptoms, though has coughed a couple of times in front of me.     OBJECTIVE:  Temp:  [97.9 °F (36.6 °C)-98.4 °F (36.9 °C) HCl  2 mg Oral Nightly   • topiramate  100 mg Oral Nightly   • amLODIPine-lisinopril (LOTREL 10/40) combination tablet ([facility] only)   Oral Daily   • Milnacipran HCl  50 mg Oral QAM   • Milnacipran HCl  100 mg Oral Nightly   • vancomycin  15 mg/kg Intravenous

## 2019-04-28 NOTE — PROGRESS NOTES
S: patient feels slightly better. He says he has the flu however. O: NAD, AAOX3     04/28/19  0409   BP:    Pulse: 66   Resp:    Temp:      LLE: erythema improving, less intense and receding.  There is no worsening of this  There is no fluctuance palpab

## 2019-04-28 NOTE — PLAN OF CARE
Pt A&O. On room air. Tele and  monitoring maintained. Pt wearing cpap at night. Scds to RLE. Tolerating diet with good appetite. Last BM 4/27/19. Voiding w/o difficulty. Pain managed with oral medications. Pt up independently.  Left lower leg (calf) old

## 2019-04-28 NOTE — RESPIRATORY THERAPY NOTE
Nasal swab performed for  Resp. FLU panel expanded  ; specimen obtained, labeled and sent to lab. All ppe worn during procedure. Well tolerated by patient.

## 2019-04-28 NOTE — PLAN OF CARE
Received in bed resting. Up to bathroom with standby assistance. Voiding without difficulty. Complains of pain in left shin. Medicated as needed for pain see MAR. Instructed not to get up without assistance. Verbalizes understanding.  Instructed on NPO afte

## 2019-04-28 NOTE — PROGRESS NOTES
[facility]                INFECTIOUS DISEASE PROGRESS NOTE    Audrey Cobian Patient Status:  Observation    1970 MRN [id number]   [facility] 3SW-A Attending Delvin Esparza, [address] [facility] Day # 0 PCP Lennox Saliva, MD     A Shoulder pain     DICK on CPAP     Pelvic pain in male     Low back pain     Family history of early CAD     Knee pain     Rotator cuff syndrome of right shoulder     Chondromalacia of left knee     Anxiety     Hypogonadotropic hypogonadism ([address]     Collette

## 2019-04-28 NOTE — RESPIRATORY THERAPY NOTE
DICK : EQUIPMENT USE: DAILY SUMMARY                                            SET MODE: CPAP                                          USAGE IN HOURS:6:11                                          90% EXP. PRESSURE(EP

## 2019-04-29 PROCEDURE — 0J9P0ZZ DRAINAGE OF LEFT LOWER LEG SUBCUTANEOUS TISSUE AND FASCIA, OPEN APPROACH: ICD-10-PCS | Performed by: ORTHOPAEDIC SURGERY

## 2019-04-29 RX ORDER — METOCLOPRAMIDE HYDROCHLORIDE 5 MG/ML
10 INJECTION INTRAMUSCULAR; INTRAVENOUS AS NEEDED
Status: DISCONTINUED | OUTPATIENT
Start: 2019-04-29 | End: 2019-04-29 | Stop reason: HOSPADM

## 2019-04-29 RX ORDER — BISACODYL 10 MG
10 SUPPOSITORY, RECTAL RECTAL
Status: DISCONTINUED | OUTPATIENT
Start: 2019-04-29 | End: 2019-04-29

## 2019-04-29 RX ORDER — CEFAZOLIN SODIUM/WATER 2 G/20 ML
SYRINGE (ML) INTRAVENOUS
Status: DISCONTINUED | OUTPATIENT
Start: 2019-04-29 | End: 2019-04-29

## 2019-04-29 RX ORDER — SODIUM PHOSPHATE, DIBASIC AND SODIUM PHOSPHATE, MONOBASIC 7; 19 G/133ML; G/133ML
1 ENEMA RECTAL ONCE AS NEEDED
Status: DISCONTINUED | OUTPATIENT
Start: 2019-04-29 | End: 2019-05-02

## 2019-04-29 RX ORDER — HYDROMORPHONE HYDROCHLORIDE 1 MG/ML
1 INJECTION, SOLUTION INTRAMUSCULAR; INTRAVENOUS; SUBCUTANEOUS EVERY 2 HOUR PRN
Status: DISCONTINUED | OUTPATIENT
Start: 2019-04-29 | End: 2019-05-02

## 2019-04-29 RX ORDER — ASPIRIN 325 MG
325 TABLET ORAL 2 TIMES DAILY
Status: DISCONTINUED | OUTPATIENT
Start: 2019-04-29 | End: 2019-05-02

## 2019-04-29 RX ORDER — HYDROMORPHONE HYDROCHLORIDE 1 MG/ML
0.5 INJECTION, SOLUTION INTRAMUSCULAR; INTRAVENOUS; SUBCUTANEOUS EVERY 2 HOUR PRN
Status: DISCONTINUED | OUTPATIENT
Start: 2019-04-29 | End: 2019-05-02

## 2019-04-29 RX ORDER — HYDROMORPHONE HYDROCHLORIDE 1 MG/ML
INJECTION, SOLUTION INTRAMUSCULAR; INTRAVENOUS; SUBCUTANEOUS
Status: COMPLETED
Start: 2019-04-29 | End: 2019-04-29

## 2019-04-29 RX ORDER — NALOXONE HYDROCHLORIDE 0.4 MG/ML
80 INJECTION, SOLUTION INTRAMUSCULAR; INTRAVENOUS; SUBCUTANEOUS AS NEEDED
Status: DISCONTINUED | OUTPATIENT
Start: 2019-04-29 | End: 2019-04-29 | Stop reason: HOSPADM

## 2019-04-29 RX ORDER — BISACODYL 10 MG
10 SUPPOSITORY, RECTAL RECTAL ONCE
Status: COMPLETED | OUTPATIENT
Start: 2019-04-29 | End: 2019-04-29

## 2019-04-29 RX ORDER — SENNOSIDES 8.6 MG
8.6 TABLET ORAL 2 TIMES DAILY
Status: DISCONTINUED | OUTPATIENT
Start: 2019-04-29 | End: 2019-05-02

## 2019-04-29 RX ORDER — DEXTROSE AND SODIUM CHLORIDE 5; .45 G/100ML; G/100ML
INJECTION, SOLUTION INTRAVENOUS CONTINUOUS
Status: DISCONTINUED | OUTPATIENT
Start: 2019-04-29 | End: 2019-05-02

## 2019-04-29 RX ORDER — POLYETHYLENE GLYCOL 3350 17 G/17G
17 POWDER, FOR SOLUTION ORAL DAILY PRN
Status: DISCONTINUED | OUTPATIENT
Start: 2019-04-29 | End: 2019-04-29

## 2019-04-29 RX ORDER — SODIUM CHLORIDE, SODIUM LACTATE, POTASSIUM CHLORIDE, CALCIUM CHLORIDE 600; 310; 30; 20 MG/100ML; MG/100ML; MG/100ML; MG/100ML
INJECTION, SOLUTION INTRAVENOUS CONTINUOUS
Status: DISCONTINUED | OUTPATIENT
Start: 2019-04-29 | End: 2019-04-29 | Stop reason: HOSPADM

## 2019-04-29 RX ORDER — ONDANSETRON 2 MG/ML
4 INJECTION INTRAMUSCULAR; INTRAVENOUS AS NEEDED
Status: DISCONTINUED | OUTPATIENT
Start: 2019-04-29 | End: 2019-04-29 | Stop reason: HOSPADM

## 2019-04-29 RX ORDER — DOCUSATE SODIUM 100 MG/1
100 CAPSULE, LIQUID FILLED ORAL 2 TIMES DAILY
Status: DISCONTINUED | OUTPATIENT
Start: 2019-04-29 | End: 2019-04-29

## 2019-04-29 RX ORDER — BACITRACIN 50000 [USP'U]/1
INJECTION, POWDER, LYOPHILIZED, FOR SOLUTION INTRAMUSCULAR AS NEEDED
Status: DISCONTINUED | OUTPATIENT
Start: 2019-04-29 | End: 2019-04-29

## 2019-04-29 RX ORDER — ONDANSETRON 2 MG/ML
4 INJECTION INTRAMUSCULAR; INTRAVENOUS EVERY 6 HOURS PRN
Status: DISCONTINUED | OUTPATIENT
Start: 2019-04-29 | End: 2019-05-02

## 2019-04-29 RX ORDER — HYDROMORPHONE HYDROCHLORIDE 1 MG/ML
0.4 INJECTION, SOLUTION INTRAMUSCULAR; INTRAVENOUS; SUBCUTANEOUS EVERY 5 MIN PRN
Status: DISCONTINUED | OUTPATIENT
Start: 2019-04-29 | End: 2019-04-29 | Stop reason: HOSPADM

## 2019-04-29 RX ORDER — HYDROCODONE BITARTRATE AND ACETAMINOPHEN 5; 325 MG/1; MG/1
1 TABLET ORAL AS NEEDED
Status: DISCONTINUED | OUTPATIENT
Start: 2019-04-29 | End: 2019-04-29 | Stop reason: HOSPADM

## 2019-04-29 RX ORDER — HYDROCODONE BITARTRATE AND ACETAMINOPHEN 5; 325 MG/1; MG/1
2 TABLET ORAL AS NEEDED
Status: DISCONTINUED | OUTPATIENT
Start: 2019-04-29 | End: 2019-04-29 | Stop reason: HOSPADM

## 2019-04-29 NOTE — PROGRESS NOTES
Noticed rash to pt's back this afternoon. Per pt, he has \"very sensitive skin\", and it \"gets irritated from the sheets\". States is not itchy. Will continue to monitor.

## 2019-04-29 NOTE — PROGRESS NOTES
Bernadette STEELE. 50. Patient Status:  Observation    1970 MRN [id number]   [facility] 3SW-A Attending Mery Carrington,    Hosp Day # 0 PCP Casimiro Mejias MD     Brandy Rodrigez is a 47year old male patient.     Mari Jackson Date Noted: 04/04/2014      Rotator cuff syndrome of right shoulder         Date Noted: 04/04/2014      Chondromalacia of left knee         Date Noted: 04/04/2014      Family history of early CAD         Date Noted: 12/31/2013      Pelvic pain in male pulse 66, temperature 98.1 °F (36.7 °C), temperature source Oral, resp. rate 14, height 5' 3\" (1.6 m), weight 165 lb (74.8 kg), SpO2 96 %. Subjective:  Symptoms:  Stable. Diet:  NPO. Activity level: Impaired due to pain.     Pain:  He complains

## 2019-04-29 NOTE — PLAN OF CARE
Patient up with min assist. Pain to left shin more when weight bearing. Numbness to left foot. Redness and swelling noted to left shin. Patient taking norco for pain with good relief noted per patient. NPO after midnight for possible I&D today.  Patient junaid

## 2019-04-29 NOTE — PROGRESS NOTES
[facility] Hospitalist Progress Note     PCP: Irina Stevens MD    SUBJECTIVE:  No CP, SOB, or N/V.    OBJECTIVE:  Temp:  [98 °F (36.7 °C)-98.9 °F (37.2 °C)] 98.1 °F (36.7 °C)  Pulse:  [52-89] 66  Resp:  [14-18] 14  BP: (109-115)/(69-82) 109/76    Intake/O • Terazosin HCl  2 mg Oral Nightly   • topiramate  100 mg Oral Nightly   • amLODIPine-lisinopril (LOTREL 10/40) combination tablet ([facility] only)   Oral Daily   • Milnacipran HCl  50 mg Oral QAM   • Milnacipran HCl  100 mg Oral Nightly       HYDROmorphone HC

## 2019-04-29 NOTE — PLAN OF CARE
Pt pain managed with PRN Capulin and scheduled medications. Will switch to IV Dilaudid now since pt is NPO for OR this afternoon. VSS. Ambulating well. Voiding without difficulty. IV Vanco given per orders.  Plan: OR today with Dr. Johan Mireles - around 4pm per surg development  - Assess and document skin integrity  - Monitor for areas of redness and/or skin breakdown  - Initiate interventions, skin care algorithm/standards of care as needed  Outcome: Progressing  Goal: Incision(s), wounds(s) or drain site(s) healing

## 2019-04-29 NOTE — BRIEF OP NOTE
Pre-Operative Diagnosis: Wound infection [T14. 8XXA, L08.9]     Post-Operative Diagnosis: Wound infection [T14. 8XXA, L08.9]      Procedure Performed:   Procedure(s):  IRRIGATION AND DEBRIDEMENT LEFT LOWER LEG    Surgeon(s) and Role:     Quita Daley MD

## 2019-04-29 NOTE — RESPIRATORY THERAPY NOTE
DICK Equipment Usage Summary :            Set Mode : CPAP           Usage in Hours:1;24          90% Pressure (EPAP) : 11           90% Insp Pressure (IPAP);           AHI : 6.3          Supplemental Oxygen :      LPM

## 2019-04-29 NOTE — PROGRESS NOTES
[facility]                INFECTIOUS DISEASE PROGRESS NOTE    Mansoor Kaufman Patient Status:  Observation    1970 MRN [id number]   [facility] 3SW-A Attending Giorgio Lang, [address] [facility] Day # 0 PCP Franc Ricci MD     A Active Problem List:     Chronic pain     Chronic pain disorder     Shoulder pain     DICK on CPAP     Pelvic pain in male     Low back pain     Family history of early CAD     Knee pain     Rotator cuff syndrome of right shoulder     Chondromalacia of left

## 2019-04-29 NOTE — PAYOR COMM NOTE
--------------  ADMISSION REVIEW     Payor: [facility]  Subscriber #:  [id number]  Authorization Number: [id number]    Admit date: 4/26/19  Admit time: 2310       Admitting Physician: Nicolás Lynn MD  Attending Physician:  DO Malcolm Phelps noted.  Strong dorsalis pedis pulse. L flex extend all digits. Sensation intact light touch.            ED Course     Labs Reviewed   COMP METABOLIC PANEL (14) - Abnormal; Notable for the following components:       Result Value    Glucose 104 (*)     [facility] Hospitalist H&P       CC: Patient presents with:  Cellulitis (integumentary, infectious)      OBJECTIVE:  /79 (BP Location: Left arm)   Pulse 69   Temp 98.2 °F (36.8 °C) (Oral)   Resp 16   Ht 160 cm (5' 3\")   Wt 165 lb (74.8 kg)   SpO2 93%   BMI 29. collection deep to wound  Ortho on consult, possible I/D vs diagnostic needle aspiration  Continue vancomycin pending micro updates        4/28/19 -     04/28/19 1545 98.6 °F (37 °C) — 16 109/69 — — — — TM   04/28/19 1150 98 °F (36.7 °C) 52 16 114/69 96 %

## 2019-04-29 NOTE — PROGRESS NOTES
[facility] dosing service    Follow-up Pharmacokinetic Consult for Vancomycin Dosing     Shila Leslie is a 47year old male who is being treated for cellulitis/abscess. Patient is on day 4 of Vancomycin and is currently receiving 1 gm IV Q 8 hours.

## 2019-04-30 NOTE — RESPIRATORY THERAPY NOTE
DICK - Equipment Use Daily Summary:                  . Set Mode: CPAP                . Usage in hours: 6:15                . 90% Pressure (EPAP) level: 11                . 90% Insp. Pressure (IPAP): [place] AHI: 1.2                .  Supplemental Oxy

## 2019-04-30 NOTE — PAYOR COMM NOTE
--------------  CONTINUED STAY REVIEW    Payor: [employer]  Subscriber #:  [id number]  Authorization Number: [id number]    Admit date: 4/26/19  Admit time: 2310    Admitting Physician: Rocael Murphy MD  Attending Physician:  DO Janay Cespedes

## 2019-04-30 NOTE — PLAN OF CARE
Patient has an outpatient psychiatrist Dr. Marilee Fry. He should call Dr. Angella Stewart regarding his psych meds. Will DC psych consult.      Dr. Sam Leal

## 2019-04-30 NOTE — PROGRESS NOTES
NURSING ADMISSION NOTE      Patient transferred from PACU  Oriented to room. Safety precautions initiated. Bed in low position. Call light in reach.

## 2019-04-30 NOTE — PLAN OF CARE
Received pt from PACU, he is AOX4. Has an acewrap dressing on his LLE with davol drain to suction. No output yet. Up with min assist using the walker. He denies any numbness and or tingling. He denies any nausea. Voided adequate amount of urine.  DICK with C

## 2019-04-30 NOTE — DIETARY NOTE
[facility]   CLINICAL NUTRITION    Desiree Deal     Admitting diagnosis:  Cellulitis of left lower extremity [I28.830]    Ht: 160 cm (5' 3\")  Wt: 74.8 kg (165 lb). This is 133% of IBW  Body mass index is 29.23 kg/m².   IBW: 56.3 kg    Labs/Meds re

## 2019-04-30 NOTE — OPERATIVE REPORT
[address]    PATIENT'S NAME: Rich Rodriguez   ATTENDING PHYSICIAN: Dwayne Henley D.O.   OPERATING PHYSICIAN: Cal Morfin M.D.    PATIENT ACCOUNT#:   [de-identified]    LOCATION:  [address] [place] [address]  MEDICAL RECORD #:   [id number]       DATE OF BIRTH:

## 2019-04-30 NOTE — PROGRESS NOTES
[facility]                INFECTIOUS DISEASE PROGRESS NOTE    Fay Rodneyina Patient Status:  Observation    1970 MRN [id number]   [facility] 3SW-A Attending Karthik Correa, [address] [facility] Day # 4 PCP Rita Alvarez MD     A (Preliminary result)    Collection Time: 04/29/19  6:25 PM   Result Value Ref Range    Anaerobic Culture Pending N/A   3.  BLOOD CULTURE     Status: None (Preliminary result)    Collection Time: 04/26/19  8:16 PM   Result Value Ref Range    Blood Culture Re
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
## 2019-04-30 NOTE — PLAN OF CARE
Dr. Gila Watts called regarding pt concerned about the [facility] paperwork to ensure it was in process for his employer. Pt was supposed to have appointment with Dr. Gila Watts today which Dr. Gila Watts made note of hospitalization on chart.  RN updated pt that paperwork was

## 2019-04-30 NOTE — PLAN OF CARE
Pt in chair. Ambulates well with min assist and walker. Pain severe at times. Tolerating norco well. RA, VSS. Tele per DICK orders. ASA BID. BM yesterday after suppository, hx chronic constipation. Davol drain removed per pt this am in bathroom.  Dr. Maximo Torres p

## 2019-05-01 PROCEDURE — 90792 PSYCH DIAG EVAL W/MED SRVCS: CPT | Performed by: OTHER

## 2019-05-01 RX ORDER — HYDROCODONE BITARTRATE AND ACETAMINOPHEN 5; 325 MG/1; MG/1
1-2 TABLET ORAL EVERY 4 HOURS PRN
Qty: 30 TABLET | Refills: 0 | Status: SHIPPED | OUTPATIENT
Start: 2019-05-01 | End: 2019-06-10

## 2019-05-01 RX ORDER — PSEUDOEPHEDRINE HCL 30 MG
100 TABLET ORAL 2 TIMES DAILY
Qty: 60 CAPSULE | Refills: 0 | Status: SHIPPED | OUTPATIENT
Start: 2019-05-01 | End: 2019-06-10

## 2019-05-01 NOTE — PROGRESS NOTES
[facility] Hospitalist Progress Note     PCP: Brandon Nix MD    SUBJECTIVE:  - Feels better today    OBJECTIVE:  Temp:  [97.6 °F (36.4 °C)-98 °F (36.7 °C)] 98 °F (36.7 °C)  Pulse:  [61-99] 99  Resp:  [16-22] 18  BP: (105-127)/(63-76) 127/76    Intake/Ou 45 mg Oral Nightly   • Cyclobenzaprine HCl  10 mg Oral TID   • Pantoprazole Sodium  40 mg Oral QAM AC   • Terazosin HCl  2 mg Oral Nightly   • topiramate  100 mg Oral Nightly   • amLODIPine-lisinopril (LOTREL 10/40) combination tablet ([facility] only)   Oral Shania Mittal

## 2019-05-01 NOTE — PLAN OF CARE
Pt up in chair. Ambulates well with SB assist and walker. LLE tingling to foot mostly. Hx of neuropathy. RA, VSS. Tele per DICK orders. ASA BID. Vanco q8. Ace wrap and elevating extremity. Possible d/c later today. Will continue to monitor.  Pt's psychiatris

## 2019-05-01 NOTE — PAYOR COMM NOTE
--------------  CONTINUED STAY REVIEW    Payor: [facility] PPO  Subscriber #:  [id number]  Authorization Number: [id number]    Admit date: 4/26/19  Admit time: 2310    Admitting Physician: Franc Bennett MD  Attending Physician:  Pierce Denver, DO Kela Shank

## 2019-05-01 NOTE — PROGRESS NOTES
Bernadette Leigh. Patient Status:  Inpatient    1970 MRN [id number]   [facility] 3SW-A Attending Jewell Pat Day # 5 PCP Vic Hall MD     Shila Leslie is a 47year old male patient. Anxiety         Date Noted: 02/11/2015      Knee pain         Date Noted: 04/04/2014      Rotator cuff syndrome of right shoulder         Date Noted: 04/04/2014      Chondromalacia of left knee         Date Noted: 04/04/2014      Family history of early C left lower leg irrigation and debridement  [facility] 05/06/2019  Active Problems:    DICK on CPAP      Blood pressure 114/64, pulse 61, temperature 97.6 °F (36.4 °C), temperature source Oral, resp.  rate 16, height 5' 3\" (1.6 m), weight 165 lb (74.8 kg), SpO2

## 2019-05-01 NOTE — PROGRESS NOTES
Chart reviewed. Culture so far neg, but not final yet.   Would like to wait for final cx prior to d/c  D/w RN  Dr Harvey Florida will re eval in am

## 2019-05-01 NOTE — RESPIRATORY THERAPY NOTE
DICK - Equipment Use Daily Summary:                  . Set Mode: CPAP                . Usage in hours: 4:30                . 90% Pressure (EPAP) level: 11                . 90% Insp. Pressure (IPAP): Vinita Burgess AHI: 0.2                .  Supplemental Oxy

## 2019-05-01 NOTE — PLAN OF CARE
Spoke with Dr. Martha Hickman about possible D/c today. Would like final cultures and wait until tomorrow for d/c.

## 2019-05-01 NOTE — CONSULTS
Psychiatric Evaluation    Milderd Sellar YOB: 1970   Age/Gender 47year old male MRN [id number]   [facility] 3SW-A Attending Abe Martínez,*   Hosp Day # 5 PCP Olimpia Hale MD     Date of Service:  5/1/2019 Patient  Has a significant history of PTSD, trauma symptoms which caused him to feel anxious, depressed. Patient has had dissociative episodes. Patient currently denies having acute depressive symptoms. He denies having active SI, intent, plan.   Aldo Booth OTHER SURGICAL HISTORY      neuroma   • OTHER SURGICAL HISTORY      left knee x 2   • OTHER SURGICAL HISTORY      S/P Right scrotal exploration with revision of right vasectomy and excision of prominent right appendix testicle 4/18/17   • OTHER SURGICAL HI process: logical and sequential  Thought content: no delusions, obsessions, or other thought abnormalities  Perceptions: no hallucinations  Consciousness/Orientation: Alert and oriented x 4   Concentration:   fair as evidenced by patient's ability to atten mg Oral BID   • vancomycin  15 mg/kg Intravenous Q8H   • docusate sodium  100 mg Oral BID   • busPIRone HCl  10 mg Oral TID   • Dicyclomine HCl  10 mg Oral TID AC and HS   • gabapentin  300 mg Oral TID   • amphetamine-dextroamphetamine  5 mg Oral BID AC

## 2019-05-02 VITALS
HEIGHT: 63 IN | WEIGHT: 165 LBS | TEMPERATURE: 98 F | SYSTOLIC BLOOD PRESSURE: 91 MMHG | OXYGEN SATURATION: 94 % | BODY MASS INDEX: 29.23 KG/M2 | RESPIRATION RATE: 11 BRPM | DIASTOLIC BLOOD PRESSURE: 61 MMHG | HEART RATE: 70 BPM

## 2019-05-02 RX ORDER — ASPIRIN 325 MG
325 TABLET ORAL 2 TIMES DAILY
Qty: 28 TABLET | Refills: 0 | Status: SHIPPED | OUTPATIENT
Start: 2019-05-02 | End: 2019-05-02

## 2019-05-02 NOTE — PLAN OF CARE
D/c paperwork given, questions answered and concerns addressed. Will d/c home via personal car. Script for norco filled here.

## 2019-05-02 NOTE — PAYOR COMM NOTE
--------------  CONTINUED STAY REVIEW    Payor: [facility]  Subscriber #:  [id number]  Authorization Number: [id number]    Admit date: 4/26/19  Admit time: 2310    Admitting Physician: Nicolás Lynn MD  Attending Physician:  Levi Ingram,

## 2019-05-02 NOTE — PROGRESS NOTES
[facility]                INFECTIOUS DISEASE PROGRESS NOTE    Abdon Jorge Patient Status:  Observation    1970 MRN [id number]   [facility] 3SW-A Attending Diamond Aguirre, [address] [facility] Day # 6 PCP Prudence Miller MD     A Collection Time: 04/29/19  6:25 PM   Result Value Ref Range    Tissue Culture Result No Growth 2 Days N/A    Tissue Smear 1+ WBCs seen N/A    Tissue Smear No organisms seen N/A   2.  ANAEROBIC CULTURE     Status: None (Preliminary result)    Collection Time for discharge abx    Addendum cx no growth, can dc with raghav Lowery MD  [facility]  [phone]
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
## 2019-05-02 NOTE — PLAN OF CARE
Pt up in chair. Ambulating well adlib. RA, VSS. Regular diet. Tolerating well. DICK with CPAP with tele. Daily dressing change done today. Paged Dr. Heather Bolton regarding d/c instructions.  Dr. Heather Bolton stated may resume work Monday, [facility] paperwork pending, follow up

## 2019-05-02 NOTE — PLAN OF CARE
Problem: Patient/Family Goals  Goal: Patient/Family Long Term Goal  Description  Patient's Long Term Goal: Get back to work     Interventions:  - take antibiotics as ordered  -keep incision clean and dry   -take pain medications as ordered  - See additio planning if the patient needs post-hospital services based on physician/LIP order or complex needs related to functional status, cognitive ability or social support system  Outcome: Progressing   Pain well controlled with oral medication.  Pt ambulating ind

## 2019-05-02 NOTE — RESPIRATORY THERAPY NOTE
DICK : EQUIPMENT USE: DAILY SUMMARY                                            SET MODE: CPAP                                          USAGE IN HOURS: 2:50                                          90% EXP. PRESSURE(E

## 2019-05-03 NOTE — PAYOR COMM NOTE
--------------  DISCHARGE REVIEW    Payor: [employer]ADD[employer]  Subscriber #:  [id number]  Authorization Number: [id number]    Admit date: 4/26/19  Admit time:  2310  Discharge Date: 5/2/2019 12:35 PM     Admitting Physician: Angie Fenton MD  Primary Care P

## 2019-05-20 ENCOUNTER — HOSPITAL ENCOUNTER (INPATIENT)
Facility: HOSPITAL | Age: 49
LOS: 1 days | Discharge: HOME | DRG: 863 | End: 2019-05-21
Attending: EMERGENCY MEDICINE | Admitting: INTERNAL MEDICINE
Payer: COMMERCIAL

## 2019-05-20 DIAGNOSIS — L03.116 CELLULITIS OF LEFT LOWER EXTREMITY: Primary | ICD-10-CM

## 2019-05-20 PROCEDURE — 99285 EMERGENCY DEPT VISIT HI MDM: CPT

## 2019-05-20 PROCEDURE — 87040 BLOOD CULTURE FOR BACTERIA: CPT | Performed by: EMERGENCY MEDICINE

## 2019-05-20 PROCEDURE — 36415 COLL VENOUS BLD VENIPUNCTURE: CPT

## 2019-05-20 PROCEDURE — 96366 THER/PROPH/DIAG IV INF ADDON: CPT

## 2019-05-20 PROCEDURE — 96375 TX/PRO/DX INJ NEW DRUG ADDON: CPT

## 2019-05-20 PROCEDURE — 96365 THER/PROPH/DIAG IV INF INIT: CPT

## 2019-05-20 PROCEDURE — 85025 COMPLETE CBC W/AUTO DIFF WBC: CPT | Performed by: EMERGENCY MEDICINE

## 2019-05-20 PROCEDURE — 80053 COMPREHEN METABOLIC PANEL: CPT

## 2019-05-20 PROCEDURE — 80053 COMPREHEN METABOLIC PANEL: CPT | Performed by: EMERGENCY MEDICINE

## 2019-05-20 PROCEDURE — 93005 ELECTROCARDIOGRAM TRACING: CPT

## 2019-05-20 PROCEDURE — 96367 TX/PROPH/DG ADDL SEQ IV INF: CPT

## 2019-05-20 PROCEDURE — 93010 ELECTROCARDIOGRAM REPORT: CPT

## 2019-05-20 PROCEDURE — 83605 ASSAY OF LACTIC ACID: CPT | Performed by: EMERGENCY MEDICINE

## 2019-05-20 PROCEDURE — 85025 COMPLETE CBC W/AUTO DIFF WBC: CPT

## 2019-05-20 RX ORDER — LORAZEPAM 2 MG/ML
1 INJECTION INTRAMUSCULAR ONCE
Status: COMPLETED | OUTPATIENT
Start: 2019-05-20 | End: 2019-05-20

## 2019-05-20 RX ORDER — MORPHINE SULFATE 4 MG/ML
2 INJECTION, SOLUTION INTRAMUSCULAR; INTRAVENOUS ONCE
Status: COMPLETED | OUTPATIENT
Start: 2019-05-20 | End: 2019-05-20

## 2019-05-20 RX ORDER — HYDROCODONE BITARTRATE AND ACETAMINOPHEN 5; 325 MG/1; MG/1
2 TABLET ORAL ONCE
Status: COMPLETED | OUTPATIENT
Start: 2019-05-20 | End: 2019-05-20

## 2019-05-20 RX ORDER — ONDANSETRON 4 MG/1
4 TABLET, ORALLY DISINTEGRATING ORAL ONCE
Status: COMPLETED | OUTPATIENT
Start: 2019-05-20 | End: 2019-05-20

## 2019-05-20 RX ORDER — LORAZEPAM 2 MG/ML
INJECTION INTRAMUSCULAR
Status: COMPLETED
Start: 2019-05-20 | End: 2019-05-20

## 2019-05-21 VITALS
DIASTOLIC BLOOD PRESSURE: 61 MMHG | OXYGEN SATURATION: 99 % | HEIGHT: 63 IN | WEIGHT: 165 LBS | SYSTOLIC BLOOD PRESSURE: 102 MMHG | BODY MASS INDEX: 29.23 KG/M2 | HEART RATE: 57 BPM | RESPIRATION RATE: 18 BRPM | TEMPERATURE: 98 F

## 2019-05-21 PROBLEM — L03.90 CELLULITIS: Status: ACTIVE | Noted: 2019-05-21

## 2019-05-21 PROCEDURE — 94660 CPAP INITIATION&MGMT: CPT

## 2019-05-21 PROCEDURE — 99213 OFFICE O/P EST LOW 20 MIN: CPT

## 2019-05-21 PROCEDURE — 87147 CULTURE TYPE IMMUNOLOGIC: CPT | Performed by: EMERGENCY MEDICINE

## 2019-05-21 PROCEDURE — 87077 CULTURE AEROBIC IDENTIFY: CPT | Performed by: EMERGENCY MEDICINE

## 2019-05-21 PROCEDURE — 5A09357 ASSISTANCE WITH RESPIRATORY VENTILATION, LESS THAN 24 CONSECUTIVE HOURS, CONTINUOUS POSITIVE AIRWAY PRESSURE: ICD-10-PCS | Performed by: INTERNAL MEDICINE

## 2019-05-21 PROCEDURE — 87186 SC STD MICRODIL/AGAR DIL: CPT | Performed by: EMERGENCY MEDICINE

## 2019-05-21 PROCEDURE — 87205 SMEAR GRAM STAIN: CPT | Performed by: EMERGENCY MEDICINE

## 2019-05-21 PROCEDURE — 87070 CULTURE OTHR SPECIMN AEROBIC: CPT | Performed by: EMERGENCY MEDICINE

## 2019-05-21 RX ORDER — GABAPENTIN 300 MG/1
300 CAPSULE ORAL 3 TIMES DAILY
Status: DISCONTINUED | OUTPATIENT
Start: 2019-05-21 | End: 2019-05-21

## 2019-05-21 RX ORDER — DICYCLOMINE HCL 20 MG
20 TABLET ORAL EVERY 4 HOURS PRN
Status: DISCONTINUED | OUTPATIENT
Start: 2019-05-21 | End: 2019-05-21

## 2019-05-21 RX ORDER — TERAZOSIN 1 MG/1
1 CAPSULE ORAL NIGHTLY
Status: DISCONTINUED | OUTPATIENT
Start: 2019-05-21 | End: 2019-05-21

## 2019-05-21 RX ORDER — AMLODIPINE BESYLATE AND BENAZEPRIL HYDROCHLORIDE 10; 40 MG/1; MG/1
1 CAPSULE ORAL DAILY
Status: DISCONTINUED | OUTPATIENT
Start: 2019-05-21 | End: 2019-05-21 | Stop reason: RX

## 2019-05-21 RX ORDER — PRAZOSIN HYDROCHLORIDE 1 MG/1
1 CAPSULE ORAL NIGHTLY
Status: DISCONTINUED | OUTPATIENT
Start: 2019-05-21 | End: 2019-05-21 | Stop reason: RX

## 2019-05-21 RX ORDER — POTASSIUM CHLORIDE 20 MEQ/1
40 TABLET, EXTENDED RELEASE ORAL ONCE
Status: COMPLETED | OUTPATIENT
Start: 2019-05-21 | End: 2019-05-21

## 2019-05-21 RX ORDER — ALPRAZOLAM 0.5 MG/1
0.25 TABLET ORAL 4 TIMES DAILY PRN
Status: DISCONTINUED | OUTPATIENT
Start: 2019-05-21 | End: 2019-05-21

## 2019-05-21 RX ORDER — KETOROLAC TROMETHAMINE 30 MG/ML
15 INJECTION, SOLUTION INTRAMUSCULAR; INTRAVENOUS EVERY 6 HOURS PRN
Status: DISCONTINUED | OUTPATIENT
Start: 2019-05-21 | End: 2019-05-21

## 2019-05-21 RX ORDER — SODIUM CHLORIDE 9 MG/ML
INJECTION, SOLUTION INTRAVENOUS CONTINUOUS
Status: DISCONTINUED | OUTPATIENT
Start: 2019-05-21 | End: 2019-05-21

## 2019-05-21 RX ORDER — MIRTAZAPINE 15 MG/1
45 TABLET, FILM COATED ORAL NIGHTLY
Status: DISCONTINUED | OUTPATIENT
Start: 2019-05-21 | End: 2019-05-21

## 2019-05-21 RX ORDER — BUSPIRONE HYDROCHLORIDE 10 MG/1
15 TABLET ORAL 3 TIMES DAILY
Status: DISCONTINUED | OUTPATIENT
Start: 2019-05-21 | End: 2019-05-21

## 2019-05-21 RX ORDER — PANTOPRAZOLE SODIUM 40 MG/1
40 TABLET, DELAYED RELEASE ORAL
Status: DISCONTINUED | OUTPATIENT
Start: 2019-05-21 | End: 2019-05-21

## 2019-05-21 RX ORDER — TOPIRAMATE 100 MG/1
100 TABLET, FILM COATED ORAL NIGHTLY
Status: DISCONTINUED | OUTPATIENT
Start: 2019-05-21 | End: 2019-05-21

## 2019-05-21 RX ORDER — LEVOFLOXACIN 750 MG/1
750 TABLET ORAL DAILY
Qty: 10 TABLET | Refills: 0 | Status: SHIPPED | OUTPATIENT
Start: 2019-05-21 | End: 2019-05-31

## 2019-05-21 RX ORDER — DOCUSATE SODIUM 100 MG/1
100 CAPSULE, LIQUID FILLED ORAL 2 TIMES DAILY
Status: DISCONTINUED | OUTPATIENT
Start: 2019-05-21 | End: 2019-05-21

## 2019-05-21 RX ORDER — HYDROCODONE BITARTRATE AND ACETAMINOPHEN 5; 325 MG/1; MG/1
1-2 TABLET ORAL EVERY 4 HOURS PRN
Status: DISCONTINUED | OUTPATIENT
Start: 2019-05-21 | End: 2019-05-21

## 2019-05-21 RX ORDER — ACETAMINOPHEN 325 MG/1
650 TABLET ORAL EVERY 6 HOURS PRN
Status: DISCONTINUED | OUTPATIENT
Start: 2019-05-21 | End: 2019-05-21

## 2019-05-21 RX ORDER — ENOXAPARIN SODIUM 100 MG/ML
40 INJECTION SUBCUTANEOUS DAILY
Status: DISCONTINUED | OUTPATIENT
Start: 2019-05-21 | End: 2019-05-21

## 2019-05-21 NOTE — PLAN OF CARE
5/21 0245: Pt received AOx4 from ER. Room air. SR. Vancomycin ended. L inner lower leg wound open to air.   0300: IVF NS at 83 cc/hr. Norco for pain. Wound consult ordered. RT notified, pt uses CPAP at night. 0500: Toradol given-satisfied.      Problem: PA

## 2019-05-21 NOTE — ED INITIAL ASSESSMENT (HPI)
A/O x 4. Patient presents with infected surgical site. Patient had a lipoma removed from left shin and had been admitted for cellulitis. Finished home antibiotics on Tuesday.

## 2019-05-21 NOTE — CM/SW NOTE
05/21/19 1400   CM/SW Screening   Referral Source    Information Source Chart review   Patient's Mental Status Alert;Oriented   Patient lives with Spouse   Patient Status Prior to Admission   Independent with ADLs and Mobility Yes   [address]

## 2019-05-21 NOTE — ED PROVIDER NOTES
Patient Seen in: [facility] Emergency Department    History   Patient presents with:  Cellulitis (integumentary, infectious)    Stated Complaint: infected surgical site    HPI  Is a 13-year-old male past medical history of anxiety, left lower extrem ESOPHAGOGASTRODUODENOSCOPY, COLONOSCOPY, POSSIBLE BIOPSY, POSSIBLE POLYPECTOMY 81056, 59144 N/A 10/16/2012    Performed by Rickey Burgos MD at [address] [place] [address] 4/17/2019    Performed by Chetan Mejia MD at [facility] site  Other systems are as noted in HPI. Constitutional and vital signs reviewed. All other systems reviewed and negative except as noted above.     Physical Exam     ED Triage Vitals [05/20/19 2117]   /80   Pulse 68   Resp 18   Temp 98.3 °F (36 following orders were created for panel order CBC WITH DIFFERENTIAL WITH PLATELET.   Procedure                               Abnormality         Status                     ---------                               -----------         ------ admission and ID consultation patient's care was discussed with infectious  Who recommends IV abx with added pseudomonal coverage with cefepime and general coverage with vancomycin. Discussed case with hospitalist who agrees to admission.   Admission dispo

## 2019-05-21 NOTE — CONSULTS
[facility]  Report of Inpatient Wound Care Consultation     [place] Grain Patient Status:  Observation    1970 MRN [id number]   [facility] 0SW-A Attending Siva Peck MD   Hosp Day # 0 PCP Litzy Jacobs MD [facility]   • ORCHIECTOMY       • OTHER SURGICAL HISTORY      neuroma   • OTHER SURGICAL HISTORY      left knee x 2   • OTHER SURGICAL HISTORY      S/P Right scrotal exploration with revision of right vasectomy and excision of prominen Equipment:  NA  Offloading/Footwear:  Na  Compression:  tubigrip    Physical Therapy Wound Goals:  1. Maintain optimal wound healing environment  2. Remove wound bioburden  3.  Decrease periwound edema      PLAN OF CARE:   Dressing change every 48 hours

## 2019-05-21 NOTE — PLAN OF CARE
Wound to left leg  Redness appears to be less   No drainage noted  Iv abx  Mild to moderate pain  Possible dc today pending hospitalist rounding        Problem: PAIN - ADULT  Goal: Verbalizes/displays adequate comfort level or patient's stated pain goal  D

## 2019-05-21 NOTE — H&P
General Medicine H&P     Patient presents with:  Cellulitis (integumentary, infectious)       PCP: Michael Herrera MD    History of Present Illness: Patient is a 47year old male with PMH including but not limited to anxiety, GERD, IBS, DICK who p/t [facility] HISTORY      left knee x 2   • OTHER SURGICAL HISTORY      S/P Right scrotal exploration with revision of right vasectomy and excision of prominent right appendix testicle 4/18/17   • OTHER SURGICAL HISTORY  2015    S/ P Radical left orchiectomy 2015    • mg Nightly   cefepime 1 g Q8H       Social History    Tobacco Use      Smoking status: Never Smoker      Smokeless tobacco: Never Used    Alcohol use: Not Currently       Fam Hx  Family History   Problem Relation Age of Onset   • Diabetes Mother    • Heart status-post lipoma removal on left lower leg 4/17/19. He complains of swelling, pain, discharge, and redness to incision site since 4/24/19.    CONTRAST USED:  100cc of Omnipaque 350  FINDINGS:  BONES:  No cortical destructive process to suggest osteomyeli None visible. COMPRESSION:  Normal compressibility, phasicity, and augmentation.  OTHER:  Small fluid collection measuring 18 x 10 x 10 mm likely representing a seroma within the calf from recent lipoma surgery removal..      CONCLUSION:  No DVT left lower

## 2019-05-21 NOTE — CONSULTS
INFECTIOUS DISEASE CONSULTATION    Emily Wood Patient Status:  Observation    1970 MRN [id number]   [facility] 0SW-A Attending Andrea Locke MD   Hosp Day # 0 PCP Aman Patterson NERVE BLOCK Bilateral 5/14/2018    Performed by Mariama Rogers MD at . [address] Bilateral 6/8/2018    Performed by Mariama Rogers MD at [address] [place] [address] information entered during case             entry, please review and add reactions, type, and             severity as needed  Lipitor [Atorvastat*    MYALGIA  Pcn [Penicillins]       HIVES, OTHER (SEE COMMENTS)  Pregabalin              MYALGIA, OTHER (SEE BREAKFAST Disp: 90 tablet Rfl: 3   HYDROcodone-acetaminophen 5-325 MG Oral Tab Take 1-2 tablets by mouth every 4 (four) hours as needed. Disp: 30 tablet Rfl: 0   docusate sodium 100 MG Oral Cap Take 100 mg by mouth 2 (two) times daily.  Disp: 60 capsule Rfl Dicyclomine HCl (BENTYL) 10 MG Oral Cap Take 1 capsule by mouth 4 (four) times daily before meals and nightly. Disp: 120 capsule Rfl: 11       Review of Systems:    A comprehensive 10 point review of systems was completed.   Pertinent positives and negati muscle pain     Pain in both upper arms     Myalgia     Obesity (BMI 30.0-34. 9)     Bursitis of right shoulder     Trochanteric bursitis of right hip     Lateral epicondylitis of left elbow     IBS (irritable bowel syndrome)     Internal derangement of kne
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
## 2019-05-22 NOTE — PAYOR COMM NOTE
--------------  DISCHARGE REVIEW    Payor: [employer] PPO  Subscriber #:  [id number]  Authorization Number: [id number]      Discharge Date: 5/21/2019  6:48 PM     Admitting Physician:   Attending Physician:  Roxanna att. providers found  Primary Care Physician: Kyra Herrera

## 2019-06-09 ENCOUNTER — HOSPITAL ENCOUNTER (EMERGENCY)
Facility: HOSPITAL | Age: 49
Discharge: HOME | End: 2019-06-09
Attending: EMERGENCY MEDICINE
Payer: COMMERCIAL

## 2019-06-09 ENCOUNTER — APPOINTMENT (OUTPATIENT)
Dept: ULTRASOUND IMAGING | Facility: HOSPITAL | Age: 49
End: 2019-06-09
Attending: EMERGENCY MEDICINE
Payer: COMMERCIAL

## 2019-06-09 VITALS
SYSTOLIC BLOOD PRESSURE: 135 MMHG | RESPIRATION RATE: 16 BRPM | OXYGEN SATURATION: 99 % | HEART RATE: 96 BPM | BODY MASS INDEX: 29 KG/M2 | TEMPERATURE: 98 F | DIASTOLIC BLOOD PRESSURE: 93 MMHG | WEIGHT: 165.38 LBS

## 2019-06-09 DIAGNOSIS — G89.18 POST-OP PAIN: Primary | ICD-10-CM

## 2019-06-09 DIAGNOSIS — Z51.89 VISIT FOR WOUND CHECK: ICD-10-CM

## 2019-06-09 PROCEDURE — 80053 COMPREHEN METABOLIC PANEL: CPT | Performed by: EMERGENCY MEDICINE

## 2019-06-09 PROCEDURE — 36415 COLL VENOUS BLD VENIPUNCTURE: CPT

## 2019-06-09 PROCEDURE — 85025 COMPLETE CBC W/AUTO DIFF WBC: CPT | Performed by: EMERGENCY MEDICINE

## 2019-06-09 PROCEDURE — 99284 EMERGENCY DEPT VISIT MOD MDM: CPT

## 2019-06-09 PROCEDURE — 93971 EXTREMITY STUDY: CPT | Performed by: EMERGENCY MEDICINE

## 2019-06-09 NOTE — ED INITIAL ASSESSMENT (HPI)
Pt to ED with concern for cellulitis. Pt states he had lipoma removed 4/17/19 to left calf, had post op cellulitis and has been admitted x 2 for this.

## 2019-06-09 NOTE — ED PROVIDER NOTES
Patient Seen in: [facility] Emergency Department    History   Patient presents with:  Postop/Procedure Problem    Stated Complaint: surgery to left lower extremity, redness, inflammation, swelling to site.      HPI    The patient is a 50-year-old mal Ankit Arrington MD at [address] [place] Left 4/17/2019    Performed by Yuko Mendez MD at [address] Left 4/29/2019    Performed by Yuko Mendez MD at [facility] [place] systems reviewed and negative except as noted above.     Physical Exam     ED Triage Vitals [06/09/19 1624]   BP (!) 135/93   Pulse 96   Resp 16   Temp 97.7 °F (36.5 °C)   Temp src Temporal   SpO2 99 %   O2 Device None (Room air)       Current:BP (!) 135/93 throughout the left lower extremity. ED Course     Labs Reviewed   COMP METABOLIC PANEL (14) - Normal   CBC WITH DIFFERENTIAL WITH PLATELET    Narrative: The following orders were created for panel order CBC WITH DIFFERENTIAL WITH PLATELET. patient's history of some reddish material that was expressible from the wound earlier that is since significantly improved as per patient. Patient does not have any evidence of cellulitis.   There may be a residual Vicryl stitch that is noted underneath t

## 2019-07-22 PROCEDURE — 87086 URINE CULTURE/COLONY COUNT: CPT | Performed by: PHYSICIAN ASSISTANT

## 2019-08-21 PROBLEM — R10.9 FLANK PAIN: Status: ACTIVE | Noted: 2019-08-21

## 2019-09-13 PROBLEM — M79.89 LEFT LEG SWELLING: Status: ACTIVE | Noted: 2019-09-13

## 2019-11-03 ENCOUNTER — APPOINTMENT (OUTPATIENT)
Dept: CT IMAGING | Facility: HOSPITAL | Age: 49
End: 2019-11-03
Attending: EMERGENCY MEDICINE
Payer: COMMERCIAL

## 2019-11-03 ENCOUNTER — HOSPITAL ENCOUNTER (EMERGENCY)
Facility: HOSPITAL | Age: 49
Discharge: HOME | End: 2019-11-03
Attending: EMERGENCY MEDICINE
Payer: COMMERCIAL

## 2019-11-03 VITALS
DIASTOLIC BLOOD PRESSURE: 73 MMHG | TEMPERATURE: 98 F | HEIGHT: 63 IN | HEART RATE: 83 BPM | SYSTOLIC BLOOD PRESSURE: 121 MMHG | WEIGHT: 160 LBS | BODY MASS INDEX: 28.35 KG/M2 | OXYGEN SATURATION: 96 % | RESPIRATION RATE: 16 BRPM

## 2019-11-03 DIAGNOSIS — G44.209 TENSION HEADACHE: Primary | ICD-10-CM

## 2019-11-03 DIAGNOSIS — R53.83 OTHER FATIGUE: ICD-10-CM

## 2019-11-03 PROCEDURE — 86403 PARTICLE AGGLUT ANTBDY SCRN: CPT | Performed by: EMERGENCY MEDICINE

## 2019-11-03 PROCEDURE — 86140 C-REACTIVE PROTEIN: CPT | Performed by: EMERGENCY MEDICINE

## 2019-11-03 PROCEDURE — 99284 EMERGENCY DEPT VISIT MOD MDM: CPT

## 2019-11-03 PROCEDURE — 85025 COMPLETE CBC W/AUTO DIFF WBC: CPT | Performed by: EMERGENCY MEDICINE

## 2019-11-03 PROCEDURE — 70496 CT ANGIOGRAPHY HEAD: CPT | Performed by: EMERGENCY MEDICINE

## 2019-11-03 PROCEDURE — 70498 CT ANGIOGRAPHY NECK: CPT | Performed by: EMERGENCY MEDICINE

## 2019-11-03 PROCEDURE — 96375 TX/PRO/DX INJ NEW DRUG ADDON: CPT

## 2019-11-03 PROCEDURE — 85652 RBC SED RATE AUTOMATED: CPT | Performed by: EMERGENCY MEDICINE

## 2019-11-03 PROCEDURE — 80053 COMPREHEN METABOLIC PANEL: CPT | Performed by: EMERGENCY MEDICINE

## 2019-11-03 PROCEDURE — 84443 ASSAY THYROID STIM HORMONE: CPT | Performed by: EMERGENCY MEDICINE

## 2019-11-03 PROCEDURE — 96374 THER/PROPH/DIAG INJ IV PUSH: CPT

## 2019-11-03 RX ORDER — HYDROCODONE BITARTRATE AND ACETAMINOPHEN 5; 325 MG/1; MG/1
1-2 TABLET ORAL EVERY 6 HOURS PRN
Qty: 10 TABLET | Refills: 0 | Status: SHIPPED | OUTPATIENT
Start: 2019-11-03 | End: 2019-11-06

## 2019-11-03 RX ORDER — GABAPENTIN 400 MG/1
400 CAPSULE ORAL 3 TIMES DAILY
COMMUNITY
End: 2020-01-02

## 2019-11-03 RX ORDER — MORPHINE SULFATE 4 MG/ML
4 INJECTION, SOLUTION INTRAMUSCULAR; INTRAVENOUS EVERY 30 MIN PRN
Status: DISCONTINUED | OUTPATIENT
Start: 2019-11-03 | End: 2019-11-03

## 2019-11-03 RX ORDER — ONDANSETRON 2 MG/ML
4 INJECTION INTRAMUSCULAR; INTRAVENOUS ONCE
Status: COMPLETED | OUTPATIENT
Start: 2019-11-03 | End: 2019-11-03

## 2019-11-03 NOTE — ED PROVIDER NOTES
Patient Seen in: [facility] Emergency Department      History   Patient presents with:  Headache (neurologic)    Stated Complaint: headache pain/right facial pain    HPI    Patient is a 39-year-old male comes to ED for evaluation of multiple complai 11CWP              Past Surgical History:   Procedure Laterality Date   • COLONOSCOPY  10/12    normal; repeat 10 yrs   • ESOPHAGOGASTRODUODENOSCOPY, COLONOSCOPY, POSSIBLE BIOPSY, POSSIBLE POLYPECTOMY 35326, 41592 N/A 10/16/2012    Performed by Yesenia Aden Currently      Comment: last use April 21, 2019    Drug use: No             Review of Systems    Positive for stated complaint: headache pain/right facial pain  Other systems are as noted in HPI. Constitutional and vital signs reviewed.       All other sys normal limits   TSH W REFLEX TO FREE T4 - Normal   MONONUCLEOSIS, QUAL - Normal   SED RATE, WESTERGREN (AUTOMATED) - Normal   C-REACTIVE PROTEIN - Normal   CBC WITH DIFFERENTIAL WITH PLATELET    Narrative:      The following orders were created for panel or discharge and follow-up instructions were provided to help prevent relapse or worsening.   Patient was instructed to follow-up with her primary care provider for further evaluation and treatment, but to return immediately to the ER for worsening, concerning

## 2019-11-13 ENCOUNTER — OFFICE VISIT (OUTPATIENT)
Dept: PHYSICAL THERAPY | Age: 49
End: 2019-11-13
Attending: INTERNAL MEDICINE
Payer: COMMERCIAL

## 2019-11-13 NOTE — PROGRESS NOTES
SPINE EVALUATION:   Referring Physician: Dr. Higinio Philippe  Diagnosis: Fibromyalgia, neck pain     Date of Service: 11/13/2019     PATIENT Hanna Sweet is a 47year old male who presents to therapy today with complaints of neck pain and fibromyal Yes  I certify the need for these services furnished under this plan of treatment and while under my care.     X___________________________________________________ Date____________________    Certification From: 86/80/4938  To:2/11/2020

## 2019-11-20 ENCOUNTER — APPOINTMENT (OUTPATIENT)
Dept: PHYSICAL THERAPY | Age: 49
End: 2019-11-20
Attending: INTERNAL MEDICINE
Payer: COMMERCIAL

## 2019-12-08 ENCOUNTER — HOSPITAL ENCOUNTER (EMERGENCY)
Facility: HOSPITAL | Age: 49
Discharge: LEFT BEFORE DISCHARGE | End: 2019-12-08
Payer: COMMERCIAL

## 2019-12-08 ENCOUNTER — HOSPITAL ENCOUNTER (EMERGENCY)
Age: 49
Discharge: HOME | End: 2019-12-08
Attending: EMERGENCY MEDICINE
Payer: COMMERCIAL

## 2019-12-08 VITALS
RESPIRATION RATE: 16 BRPM | SYSTOLIC BLOOD PRESSURE: 105 MMHG | HEIGHT: 64 IN | HEART RATE: 84 BPM | WEIGHT: 165 LBS | OXYGEN SATURATION: 97 % | DIASTOLIC BLOOD PRESSURE: 65 MMHG | TEMPERATURE: 98 F | BODY MASS INDEX: 28.17 KG/M2

## 2019-12-08 VITALS
HEIGHT: 63 IN | DIASTOLIC BLOOD PRESSURE: 60 MMHG | HEART RATE: 63 BPM | TEMPERATURE: 98 F | BODY MASS INDEX: 29.23 KG/M2 | OXYGEN SATURATION: 99 % | RESPIRATION RATE: 18 BRPM | WEIGHT: 165 LBS | SYSTOLIC BLOOD PRESSURE: 101 MMHG

## 2019-12-08 DIAGNOSIS — M79.7 FIBROMYALGIA SYNDROME: ICD-10-CM

## 2019-12-08 DIAGNOSIS — G89.29 OTHER CHRONIC PAIN: Primary | ICD-10-CM

## 2019-12-08 DIAGNOSIS — R51.9 FACIAL PAIN: ICD-10-CM

## 2019-12-08 PROCEDURE — 99283 EMERGENCY DEPT VISIT LOW MDM: CPT | Performed by: EMERGENCY MEDICINE

## 2019-12-08 PROCEDURE — 64450 NJX AA&/STRD OTHER PN/BRANCH: CPT | Performed by: EMERGENCY MEDICINE

## 2019-12-08 PROCEDURE — 99283 EMERGENCY DEPT VISIT LOW MDM: CPT

## 2019-12-08 RX ORDER — BUPIVACAINE HYDROCHLORIDE 5 MG/ML
INJECTION, SOLUTION EPIDURAL; INTRACAUDAL
Status: COMPLETED
Start: 2019-12-08 | End: 2019-12-08

## 2019-12-08 RX ORDER — PREDNISONE 20 MG/1
60 TABLET ORAL DAILY
Qty: 10 TABLET | Refills: 0 | Status: SHIPPED | OUTPATIENT
Start: 2019-12-08 | End: 2019-12-13

## 2019-12-08 NOTE — ED INITIAL ASSESSMENT (HPI)
Pt to ED with c/o headache. Pt was seen in ER a few weeks ago and diagnosed with tension headache. Pt saw fibro doctor a couple days later. Pt now complaining of dry mouth, eyes and numbness to R side of face for 3 weeks.

## 2019-12-08 NOTE — ED PROVIDER NOTES
Patient Seen in: [facility]      History   Patient presents with:  Headache    Stated Complaint: pain to right side of head     HPI    14-year-old male presents emergency department who is been having some right-sided head El Avina MD at [address]   • ORCHIECTOMY       • OTHER SURGICAL HISTORY      neuroma   • OTHER SURGICAL HISTORY      left knee x 2   • OTHER SURGICAL HISTORY      S/P Right scrotal exploration with revision of right vasectomy Pupils equal react to light extraocular muscles intact no scleral icterus, mucous membranes are moist, there is no erythema or exudate in the posterior pharynx, patient I is not appear swollen.   He has pain with any type of light touch over the right side pm    Follow-up:  Oriana Duron, [address] [address]  [phone]              Medications Prescribed:  Current Discharge Medication List    START taking these medications    predniSONE 20 MG Oral Tab  Take 3 tablets (60 m

## 2019-12-08 NOTE — ED INITIAL ASSESSMENT (HPI)
Here for right side head pain - states was at [facility] and they have 2 hour wait so he came to [facility] denies n/v - denies light sensitivity - c/o swelling to right eye - no swelling noted

## 2020-01-05 ENCOUNTER — HOSPITAL ENCOUNTER (EMERGENCY)
Age: 50
Discharge: HOME | End: 2020-01-05
Attending: EMERGENCY MEDICINE
Payer: COMMERCIAL

## 2020-01-05 VITALS
HEIGHT: 63 IN | SYSTOLIC BLOOD PRESSURE: 110 MMHG | WEIGHT: 167 LBS | HEART RATE: 84 BPM | BODY MASS INDEX: 29.59 KG/M2 | DIASTOLIC BLOOD PRESSURE: 60 MMHG | OXYGEN SATURATION: 100 % | TEMPERATURE: 98 F | RESPIRATION RATE: 16 BRPM

## 2020-01-05 DIAGNOSIS — R51.9 CHRONIC NONINTRACTABLE HEADACHE, UNSPECIFIED HEADACHE TYPE: Primary | ICD-10-CM

## 2020-01-05 DIAGNOSIS — G89.29 CHRONIC NONINTRACTABLE HEADACHE, UNSPECIFIED HEADACHE TYPE: Primary | ICD-10-CM

## 2020-01-05 LAB
ANION GAP SERPL CALC-SCNC: 8 MMOL/L (ref 0–18)
BASOPHILS # BLD AUTO: 0.04 X10(3) UL (ref 0–0.2)
BASOPHILS NFR BLD AUTO: 0.6 %
BUN BLD-MCNC: 16 MG/DL (ref 7–18)
BUN/CREAT SERPL: 14.2 (ref 10–20)
CALCIUM BLD-MCNC: 8.5 MG/DL (ref 8.5–10.1)
CHLORIDE SERPL-SCNC: 105 MMOL/L (ref 98–112)
CO2 SERPL-SCNC: 25 MMOL/L (ref 21–32)
CREAT BLD-MCNC: 1.13 MG/DL (ref 0.7–1.3)
DEPRECATED RDW RBC AUTO: 39.9 FL (ref 35.1–46.3)
EOSINOPHIL # BLD AUTO: 0.36 X10(3) UL (ref 0–0.7)
EOSINOPHIL NFR BLD AUTO: 5 %
ERYTHROCYTE [DISTWIDTH] IN BLOOD BY AUTOMATED COUNT: 12.8 % (ref 11–15)
GLUCOSE BLD-MCNC: 163 MG/DL (ref 70–99)
HCT VFR BLD AUTO: 45.9 % (ref 39–53)
HGB BLD-MCNC: 15.5 G/DL (ref 13–17.5)
IMM GRANULOCYTES # BLD AUTO: 0.04 X10(3) UL (ref 0–1)
IMM GRANULOCYTES NFR BLD: 0.6 %
LYMPHOCYTES # BLD AUTO: 1.52 X10(3) UL (ref 1–4)
LYMPHOCYTES NFR BLD AUTO: 20.9 %
MCH RBC QN AUTO: 28.9 PG (ref 26–34)
MCHC RBC AUTO-ENTMCNC: 33.8 G/DL (ref 31–37)
MCV RBC AUTO: 85.6 FL (ref 80–100)
MONOCYTES # BLD AUTO: 0.54 X10(3) UL (ref 0.1–1)
MONOCYTES NFR BLD AUTO: 7.4 %
NEUTROPHILS # BLD AUTO: 4.77 X10 (3) UL (ref 1.5–7.7)
NEUTROPHILS # BLD AUTO: 4.77 X10(3) UL (ref 1.5–7.7)
NEUTROPHILS NFR BLD AUTO: 65.5 %
OSMOLALITY SERPL CALC.SUM OF ELEC: 291 MOSM/KG (ref 275–295)
PLATELET # BLD AUTO: 197 10(3)UL (ref 150–450)
POTASSIUM SERPL-SCNC: 3.8 MMOL/L (ref 3.5–5.1)
RBC # BLD AUTO: 5.36 X10(6)UL (ref 4.3–5.7)
SED RATE-ML: 7 MM/HR (ref 0–12)
SODIUM SERPL-SCNC: 138 MMOL/L (ref 136–145)
WBC # BLD AUTO: 7.3 X10(3) UL (ref 4–11)

## 2020-01-05 PROCEDURE — 80048 BASIC METABOLIC PNL TOTAL CA: CPT | Performed by: PHYSICIAN ASSISTANT

## 2020-01-05 PROCEDURE — 99284 EMERGENCY DEPT VISIT MOD MDM: CPT | Performed by: EMERGENCY MEDICINE

## 2020-01-05 PROCEDURE — 96374 THER/PROPH/DIAG INJ IV PUSH: CPT | Performed by: EMERGENCY MEDICINE

## 2020-01-05 PROCEDURE — 85652 RBC SED RATE AUTOMATED: CPT | Performed by: EMERGENCY MEDICINE

## 2020-01-05 PROCEDURE — 85025 COMPLETE CBC W/AUTO DIFF WBC: CPT | Performed by: PHYSICIAN ASSISTANT

## 2020-01-05 PROCEDURE — 96375 TX/PRO/DX INJ NEW DRUG ADDON: CPT | Performed by: EMERGENCY MEDICINE

## 2020-01-05 PROCEDURE — 96361 HYDRATE IV INFUSION ADD-ON: CPT | Performed by: EMERGENCY MEDICINE

## 2020-01-05 RX ORDER — DEXAMETHASONE SODIUM PHOSPHATE 4 MG/ML
10 VIAL (ML) INJECTION ONCE
Status: COMPLETED | OUTPATIENT
Start: 2020-01-05 | End: 2020-01-05

## 2020-01-05 RX ORDER — PREDNISONE 20 MG/1
TABLET ORAL
Qty: 30 TABLET | Refills: 0 | Status: SHIPPED | OUTPATIENT
Start: 2020-01-05 | End: 2020-01-20

## 2020-01-05 RX ORDER — METOCLOPRAMIDE HYDROCHLORIDE 5 MG/ML
10 INJECTION INTRAMUSCULAR; INTRAVENOUS ONCE
Status: COMPLETED | OUTPATIENT
Start: 2020-01-05 | End: 2020-01-05

## 2020-01-05 RX ORDER — KETOROLAC TROMETHAMINE 30 MG/ML
30 INJECTION, SOLUTION INTRAMUSCULAR; INTRAVENOUS ONCE
Status: COMPLETED | OUTPATIENT
Start: 2020-01-05 | End: 2020-01-05

## 2020-01-05 RX ORDER — SUMATRIPTAN 50 MG/1
50 TABLET, FILM COATED ORAL EVERY 2 HOUR PRN
Qty: 6 TABLET | Refills: 0 | Status: SHIPPED | OUTPATIENT
Start: 2020-01-05 | End: 2020-01-14

## 2020-01-05 RX ORDER — KETOROLAC TROMETHAMINE 10 MG/1
10 TABLET, FILM COATED ORAL EVERY 6 HOURS PRN
Qty: 20 TABLET | Refills: 0 | Status: SHIPPED | OUTPATIENT
Start: 2020-01-05 | End: 2020-01-12

## 2020-01-05 RX ORDER — DIPHENHYDRAMINE HYDROCHLORIDE 50 MG/ML
25 INJECTION INTRAMUSCULAR; INTRAVENOUS ONCE
Status: COMPLETED | OUTPATIENT
Start: 2020-01-05 | End: 2020-01-05

## 2020-01-05 NOTE — ED INITIAL ASSESSMENT (HPI)
Headaches for over 1 month, working with neurology dr Albert Vaughan.  Reports headache, light sensitive, sensation change right cheek

## 2020-01-05 NOTE — ED PROVIDER NOTES
Patient Seen in: [facility] In [place]      History   Patient presents with:  Headache    Stated Complaint: migraines    48-year-old  male with a history of anxiety, esophageal reflux, hypertension, chronic pelvic pain, hypog Performed by Freddy Dela Cruz MD at  [address] Left 4/29/2019    Performed by Freddy Dela Cruz MD at [facility] MAIN OR   • GASTRO - [facility]G  10/12    normal   • LUMBAR FACET INJECTION OR MEDIAL BRANCH NERVE 84   Temp 97.6 °F (36.4 °C) (Temporal)   Resp 16   Ht 160 cm (5' 3\")   Wt 75.8 kg   SpO2 100%   BMI 29.58 kg/m²         Physical Exam  Vitals signs and nursing note reviewed. Constitutional:       General: He is not in acute distress.      Appearance: He sounds are normal. There is no distension. Tenderness: There is no tenderness. Musculoskeletal: Normal range of motion. Lymphadenopathy:      Cervical: No cervical adenopathy. Skin:     General: Skin is warm and dry. Findings: No rash.    Ne  01/05/2020    CA 8.5 01/05/2020    ESRML 7 01/05/2020       Mra Brain ([id number]=[id number])    Result Date: 12/23/2019  DATE OF SERVICE: 12.23.2019 EXAMINATION: MRA BRAIN CLINICAL INDICATION: Headache COMPARISON STUDY: None.  TECHNIQUE: 3-D time-of-flight MRA ha      Disposition and Plan     Clinical Impression:  Chronic nonintractable headache, unspecified headache type  (primary encounter diagnosis)    Disposition:  There is no disposition on file for this visit.   There is no disposition time on file for this

## 2020-01-17 ENCOUNTER — HOSPITAL ENCOUNTER (EMERGENCY)
Age: 50
Discharge: HOME | End: 2020-01-17
Attending: EMERGENCY MEDICINE
Payer: COMMERCIAL

## 2020-01-17 VITALS
HEART RATE: 70 BPM | TEMPERATURE: 98 F | OXYGEN SATURATION: 99 % | SYSTOLIC BLOOD PRESSURE: 116 MMHG | BODY MASS INDEX: 30 KG/M2 | DIASTOLIC BLOOD PRESSURE: 77 MMHG | RESPIRATION RATE: 18 BRPM | WEIGHT: 171.94 LBS

## 2020-01-17 DIAGNOSIS — G89.29 OTHER CHRONIC PAIN: ICD-10-CM

## 2020-01-17 DIAGNOSIS — G44.009 CLUSTER HEADACHE, NOT INTRACTABLE, UNSPECIFIED CHRONICITY PATTERN: Primary | ICD-10-CM

## 2020-01-17 PROCEDURE — 93005 ELECTROCARDIOGRAM TRACING: CPT

## 2020-01-17 PROCEDURE — 93010 ELECTROCARDIOGRAM REPORT: CPT

## 2020-01-17 PROCEDURE — 99284 EMERGENCY DEPT VISIT MOD MDM: CPT

## 2020-01-17 NOTE — ED PROVIDER NOTES
Patient Seen in: [facility] In [place]      History   Patient presents with:  Headache    Stated Complaint: Pt c/o cluster head aches, bluured and double vision.      HPI    26-year-old male who comes emergency department complaining o RADIOFREQUENCY Bilateral 6/8/2018    Performed by Roel Shafer MD at [address]   • ORCHIECTOMY       • OTHER SURGICAL HISTORY      neuroma   • OTHER SURGICAL HISTORY      left knee x 2   • OTHER SURGICAL HISTORY      S/P Right scr histrionic. I suspect he is feigning strokelike symptoms for attention. HEENT: Sclerae anicteric. Conjunctivae show no pallor.   Oropharynx clear, mucous membranes moist   Neck: supple, no rigidity   Lungs: good air exchange and clear   Heart: regular ra

## 2020-01-20 LAB
ATRIAL RATE: 94 BPM
P AXIS: 0 DEGREES
P-R INTERVAL: 132 MS
Q-T INTERVAL: 332 MS
QRS DURATION: 86 MS
QTC CALCULATION (BEZET): 415 MS
R AXIS: -15 DEGREES
T AXIS: 47 DEGREES
VENTRICULAR RATE: 94 BPM
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
## 2020-01-23 NOTE — PROGRESS NOTES
PHYSICAL EXAM:   Physical Exam   Constitutional:           Patient presents in office today with reported pain in face right side, right eye, neck, traps, shoulders     Current pain level reported = 10/10    Last reported dose of HYDROcodone-acetaminophe

## 2020-02-03 ENCOUNTER — HOSPITAL ENCOUNTER (INPATIENT)
Facility: HOSPITAL | Age: 50
LOS: 2 days | Discharge: HOME | DRG: 103 | End: 2020-02-05
Attending: Other | Admitting: Other
Payer: COMMERCIAL

## 2020-02-03 ENCOUNTER — APPOINTMENT (OUTPATIENT)
Dept: MRI IMAGING | Facility: HOSPITAL | Age: 50
DRG: 103 | End: 2020-02-03
Attending: Other
Payer: COMMERCIAL

## 2020-02-03 LAB
ANION GAP SERPL CALC-SCNC: 8 MMOL/L (ref 0–18)
BASOPHILS # BLD AUTO: 0.04 X10(3) UL (ref 0–0.2)
BASOPHILS NFR BLD AUTO: 0.5 %
BUN BLD-MCNC: 17 MG/DL (ref 7–18)
BUN/CREAT SERPL: 14.8 (ref 10–20)
CALCIUM BLD-MCNC: 8.4 MG/DL (ref 8.5–10.1)
CHLORIDE SERPL-SCNC: 102 MMOL/L (ref 98–112)
CO2 SERPL-SCNC: 26 MMOL/L (ref 21–32)
CREAT BLD-MCNC: 1.15 MG/DL (ref 0.7–1.3)
CRP SERPL-MCNC: 0.82 MG/DL (ref ?–0.3)
DEPRECATED RDW RBC AUTO: 39.7 FL (ref 35.1–46.3)
EOSINOPHIL # BLD AUTO: 0.11 X10(3) UL (ref 0–0.7)
EOSINOPHIL NFR BLD AUTO: 1.5 %
ERYTHROCYTE [DISTWIDTH] IN BLOOD BY AUTOMATED COUNT: 12.4 % (ref 11–15)
GLUCOSE BLD-MCNC: 167 MG/DL (ref 70–99)
HCT VFR BLD AUTO: 44.9 % (ref 39–53)
HGB BLD-MCNC: 15.4 G/DL (ref 13–17.5)
IMM GRANULOCYTES # BLD AUTO: 0.34 X10(3) UL (ref 0–1)
IMM GRANULOCYTES NFR BLD: 4.6 %
LYMPHOCYTES # BLD AUTO: 0.55 X10(3) UL (ref 1–4)
LYMPHOCYTES NFR BLD AUTO: 7.5 %
MCH RBC QN AUTO: 30 PG (ref 26–34)
MCHC RBC AUTO-ENTMCNC: 34.3 G/DL (ref 31–37)
MCV RBC AUTO: 87.4 FL (ref 80–100)
MONOCYTES # BLD AUTO: 0.38 X10(3) UL (ref 0.1–1)
MONOCYTES NFR BLD AUTO: 5.2 %
NEUTROPHILS # BLD AUTO: 5.95 X10 (3) UL (ref 1.5–7.7)
NEUTROPHILS # BLD AUTO: 5.95 X10(3) UL (ref 1.5–7.7)
NEUTROPHILS NFR BLD AUTO: 80.7 %
OSMOLALITY SERPL CALC.SUM OF ELEC: 287 MOSM/KG (ref 275–295)
PLATELET # BLD AUTO: 169 10(3)UL (ref 150–450)
POTASSIUM SERPL-SCNC: 4.3 MMOL/L (ref 3.5–5.1)
RBC # BLD AUTO: 5.14 X10(6)UL (ref 4.3–5.7)
SED RATE-ML: 10 MM/HR (ref 0–12)
SODIUM SERPL-SCNC: 136 MMOL/L (ref 136–145)
WBC # BLD AUTO: 7.4 X10(3) UL (ref 4–11)

## 2020-02-03 PROCEDURE — 5A09357 ASSISTANCE WITH RESPIRATORY VENTILATION, LESS THAN 24 CONSECUTIVE HOURS, CONTINUOUS POSITIVE AIRWAY PRESSURE: ICD-10-PCS | Performed by: HOSPITALIST

## 2020-02-03 PROCEDURE — 70544 MR ANGIOGRAPHY HEAD W/O DYE: CPT | Performed by: OTHER

## 2020-02-03 RX ORDER — ONDANSETRON 2 MG/ML
4 INJECTION INTRAMUSCULAR; INTRAVENOUS EVERY 6 HOURS
Status: COMPLETED | OUTPATIENT
Start: 2020-02-03 | End: 2020-02-04

## 2020-02-03 RX ORDER — ALPRAZOLAM 0.5 MG/1
0.25 TABLET ORAL 4 TIMES DAILY PRN
Status: DISCONTINUED | OUTPATIENT
Start: 2020-02-03 | End: 2020-02-05

## 2020-02-03 RX ORDER — DIHYDROERGOTAMINE MESYLATE 1 MG/ML
0.5 INJECTION, SOLUTION INTRAMUSCULAR; INTRAVENOUS; SUBCUTANEOUS EVERY 6 HOURS
Status: COMPLETED | OUTPATIENT
Start: 2020-02-03 | End: 2020-02-04

## 2020-02-03 RX ORDER — BUSPIRONE HYDROCHLORIDE 15 MG/1
15 TABLET ORAL 3 TIMES DAILY
Status: DISCONTINUED | OUTPATIENT
Start: 2020-02-03 | End: 2020-02-05

## 2020-02-03 RX ORDER — LORAZEPAM 2 MG/ML
1 INJECTION INTRAMUSCULAR EVERY 4 HOURS PRN
Status: DISCONTINUED | OUTPATIENT
Start: 2020-02-03 | End: 2020-02-05

## 2020-02-03 RX ORDER — TOPIRAMATE 100 MG/1
100 TABLET, FILM COATED ORAL NIGHTLY
Status: DISCONTINUED | OUTPATIENT
Start: 2020-02-03 | End: 2020-02-05

## 2020-02-03 RX ORDER — ORPHENADRINE CITRATE 100 MG/1
100 TABLET, EXTENDED RELEASE ORAL 2 TIMES DAILY PRN
Status: DISCONTINUED | OUTPATIENT
Start: 2020-02-03 | End: 2020-02-05

## 2020-02-03 RX ORDER — TAMSULOSIN HYDROCHLORIDE 0.4 MG/1
0.4 CAPSULE ORAL DAILY
Status: DISCONTINUED | OUTPATIENT
Start: 2020-02-04 | End: 2020-02-05

## 2020-02-03 RX ORDER — MIRTAZAPINE 15 MG/1
45 TABLET, FILM COATED ORAL NIGHTLY
Status: DISCONTINUED | OUTPATIENT
Start: 2020-02-03 | End: 2020-02-05

## 2020-02-03 RX ORDER — DEXTROAMPHETAMINE SACCHARATE, AMPHETAMINE ASPARTATE, DEXTROAMPHETAMINE SULFATE AND AMPHETAMINE SULFATE 1.25; 1.25; 1.25; 1.25 MG/1; MG/1; MG/1; MG/1
5 TABLET ORAL
Status: DISCONTINUED | OUTPATIENT
Start: 2020-02-04 | End: 2020-02-05

## 2020-02-03 RX ORDER — DIPHENHYDRAMINE HYDROCHLORIDE 50 MG/ML
25 INJECTION INTRAMUSCULAR; INTRAVENOUS EVERY 4 HOURS PRN
Status: DISCONTINUED | OUTPATIENT
Start: 2020-02-03 | End: 2020-02-05

## 2020-02-03 RX ORDER — LISINOPRIL 20 MG/1
40 TABLET ORAL DAILY
Status: DISCONTINUED | OUTPATIENT
Start: 2020-02-04 | End: 2020-02-05

## 2020-02-03 RX ORDER — AMLODIPINE BESYLATE 10 MG/1
10 TABLET ORAL DAILY
Status: DISCONTINUED | OUTPATIENT
Start: 2020-02-04 | End: 2020-02-04

## 2020-02-03 RX ORDER — METHYLPREDNISOLONE SODIUM SUCCINATE 40 MG/ML
40 INJECTION, POWDER, LYOPHILIZED, FOR SOLUTION INTRAMUSCULAR; INTRAVENOUS EVERY 12 HOURS
Status: DISCONTINUED | OUTPATIENT
Start: 2020-02-03 | End: 2020-02-05

## 2020-02-03 RX ORDER — GABAPENTIN 100 MG/1
400 CAPSULE ORAL 3 TIMES DAILY
Status: DISCONTINUED | OUTPATIENT
Start: 2020-02-03 | End: 2020-02-05

## 2020-02-03 RX ORDER — PANTOPRAZOLE SODIUM 40 MG/1
40 TABLET, DELAYED RELEASE ORAL
Status: DISCONTINUED | OUTPATIENT
Start: 2020-02-04 | End: 2020-02-05

## 2020-02-03 NOTE — PLAN OF CARE
NURSING ADMISSION NOTE      Patient admitted via Cart  Oriented to room. Safety precautions initiated. Bed in low position. Call light in reach.   Admission complete  Pt c/o multiple pain problems  Placed on 15L high flow per his request  Consult vianey

## 2020-02-04 LAB — GLUCOSE BLD-MCNC: 197 MG/DL (ref 70–99)

## 2020-02-04 PROCEDURE — 90792 PSYCH DIAG EVAL W/MED SRVCS: CPT | Performed by: OTHER

## 2020-02-04 RX ORDER — HEPARIN SODIUM 5000 [USP'U]/ML
5000 INJECTION, SOLUTION INTRAVENOUS; SUBCUTANEOUS EVERY 8 HOURS SCHEDULED
Status: DISCONTINUED | OUTPATIENT
Start: 2020-02-04 | End: 2020-02-05

## 2020-02-04 RX ORDER — AMLODIPINE BESYLATE 10 MG/1
10 TABLET ORAL NIGHTLY
Status: DISCONTINUED | OUTPATIENT
Start: 2020-02-04 | End: 2020-02-05

## 2020-02-04 NOTE — RESPIRATORY THERAPY NOTE
DICK - Equipment Use Daily Summary:                  . Set Mode:                . Usage in hours:                . 90% Pressure (EPAP) level:                . 90% Insp. Pressure (IPAP): Beba Morales AHI:                .  Supplemental Oxygen:    LPM

## 2020-02-04 NOTE — PAYOR COMM NOTE
--------------  ADMISSION REVIEW     Payor: [employer]  Subscriber #:  [id number]  Authorization Number: [id number]            MEDICATIONS ADMINISTERED IN LAST 1 DAY:  ALPRAZolam (XANAX) tab 0.25 mg     Date Action Dose Route User    2/4/2020 6528 Given 0. 2/3/2020 2249 Given 4 mg Intravenous Thera Agent, RN    2/3/2020 1720 Given 4 mg Intravenous Rosalino Acosta, RN      Pantoprazole Sodium (PROTONIX) EC tab 40 mg     Date Action Dose Route User    2/4/2020 0557 Given 40 mg Oral Bonnevier, Tayl Performed by Carter Mckoy MD at [address] [place] [address]   10/12     normal   • LUMBAR FACET INJECTION OR MEDIAL BRANCH NERVE BLOCK Bilateral 5/14/2018     Performed by Benjamin Mejia MD at . [place] [address] multiple medical problems including but not limited to anxiety, HTN, DICK, here as direct admit from Neurology for  Headaches     Headaches, suspect cluster  Pt directly admitted by neurology  pt was seen by Dr. Umana Half from Pain service as outpatient and head opiate addiction advised to steer aware from opiate medications  -patient was seen by ophthalmology and per ophthalmology report exam normal as the scans  Labs checked and no leukocytosis, pt is afebrile  MRV head reviewed in Epic and patent dural sinuses

## 2020-02-04 NOTE — PAYOR COMM NOTE
--------------  ADMISSION REVIEW     Secondary Payor: [employer]  Subscriber #:  [id number]  Authorization Number: [id number]      MEDICATIONS ADMINISTERED IN LAST 1 DAY:  ALPRAZolam Ewa Love) tab 0.25 mg     Date Action Dose Route User    2/4/2020 2322 Give Igor Lake, RN    2/3/2020 8551 Given 4 mg Intravenous Thera Agent, RN    2/3/2020 1720 Given 4 mg Intravenous Vj Shayy., RN      Pantoprazole Sodium (PROTONIX) EC tab 40 mg     Date Action Dose Route User    2/4/2020 0526 Given 40 mg Oral Shelby DEBRIDEMENT Left 4/29/2019     Performed by Cesar Thomas MD at [facility] MAIN OR   • GASTRO - [facility]   10/12     normal   • LUMBAR FACET INJECTION OR MEDIAL BRANCH NERVE BLOCK Bilateral 5/14/2018     Performed by Iva Valdes MD at [address] [place] [address]                Assessment/plan  48 yo male with multiple medical problems including but not limited to anxiety, HTN, DICK, here as direct admit from Neurology for [facility]     Headaches, suspect cluster  Pt directly admitted by neurology  pt was seen by injections appropriate.  Per pain service, given hx of alcohol and opiate addiction advised to steer aware from opiate medications  -patient was seen by ophthalmology and per ophthalmology report exam normal as the scans  Labs checked and no leukocytosis, p

## 2020-02-04 NOTE — PLAN OF CARE
Found out that Dr. Camacho Briscoe is no longer with [address]. I will see patient later today. Dr Louisa Kan    He has an outpatient psychiatrist Dr. [facility] Will DC psych consult.     Dr. Libia Tariq
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
## 2020-02-04 NOTE — CONSULTS
[facility] hospialist H+P  PCP  Rosa Slater MD  Consulted at the request of Dr. Lorenzo Felty  Reason for consult medical co-management  HPI 46 yo male with multiple medical problems including but not limited to anxiety, PTSD, depression, fibromyalgia, HTN, DICK, appendix testicle 4/18/17   • OTHER SURGICAL HISTORY  2015    S/ P Radical left orchiectomy 2015    • OTHER SURGICAL HISTORY  06/28/2017    Radical R orchiectomy 6/28/17   • RADICAL INGUINAL ORCHIECTOMY N/A 6/28/2017    Performed by Priya Angel MD at [facility] April 21, 2019      Drug use: No      Sexual activity: Not on file    Lifestyle      Physical activity:        Days per week: Not on file        Minutes per session: Not on file      Stress: Not on file    Relationships      Social connections:        Talk MYALGIA    Comment:Involuntary limb movement and impotence  Zetia [Ezetimibe]       MYALGIA, OTHER (SEE COMMENTS)  Med  No current facility-administered medications on file prior to encounter.    predniSONE 20 MG Oral Tab, 100mg for 5 days, then 80mg for 4 daily as needed. , Disp: 30 tablet, Rfl: 1  triamcinolone acetonide 0.1 % External Cream, Apply bid to eczema on trunk up to 14 days prn, Disp: 60 g, Rfl: 3  Melatonin 10 MG Oral Cap, Take 10 mg by mouth nightly as needed. , Disp: , Rfl:   Alfuzosin HCl ER 1 EOMI, OP clear, anicteric  Neck supple  Skin warm, dry, no lesions on face/head  CV: RRR, nl S1/S2  Pulm: CTA b/l  Abd; soft, not tender, +BS  Ext: no edema  Neuro moving all extremities    Labs  Recent Labs   Lab 02/03/20  1853   RBC 5.14   HGB 15.4   HCT

## 2020-02-04 NOTE — PROGRESS NOTES
[facility] hospitalist daily note  Patient was seen/examined on 2/4/20    S; currently feels better, headache is controlled  No chest pain, no SOB, no abd pain, no nausea  No new numbness/tingling  Per pt the right sided headache is episodic, occurs 4 times per d Lee Hagen MD  [facility]  [phone]

## 2020-02-04 NOTE — PLAN OF CARE
Patient is A&O x4. VSS- NSR on tele. Patient c/o cluster headaches via photophobia. Neurology plan for DHE treatment. Zofran given for premedication. Patient denies nausea/SOB/dizziness. MVR completed. PRN melatonin given to promote sleep. SCDs in place.  I
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
## 2020-02-04 NOTE — PROGRESS NOTES
PSYCH CONSULT    Date of Admission: 2/3/20  Date of Consult: 2/4/20  Reason for Consultation: Anxiety, depression, PTSD    Impression: He has PTSD/anxiety/depression and severe guilt and feelings of worthlessness that cause him to self-injure.  He takes a n hearing derogatory voices in Dec 2019. Generalized anxiety disorder.  Panic disorder, moderate    Chronic pelvic pain syndrome requiring orchidectomy     Severe alcohol use disorder, in remission since 4/2019    Prescription opioid use disorder, in blue

## 2020-02-05 VITALS
TEMPERATURE: 98 F | SYSTOLIC BLOOD PRESSURE: 118 MMHG | DIASTOLIC BLOOD PRESSURE: 74 MMHG | RESPIRATION RATE: 12 BRPM | HEART RATE: 76 BPM | OXYGEN SATURATION: 98 %

## 2020-02-05 LAB — ANA SCREEN: NEGATIVE

## 2020-02-05 PROCEDURE — 99232 SBSQ HOSP IP/OBS MODERATE 35: CPT | Performed by: OTHER

## 2020-02-05 RX ORDER — DIVALPROEX SODIUM 250 MG/1
250 TABLET, DELAYED RELEASE ORAL 2 TIMES DAILY PRN
Qty: 90 TABLET | Refills: 1 | Status: SHIPPED | OUTPATIENT
Start: 2020-02-05 | End: 2020-06-03

## 2020-02-05 NOTE — PLAN OF CARE
PT A/O, 99% ON RA, CPAP AT NIGHT, SR/SB, C/O OF RT HEAD/FACE PAIN OF 5, GIVEN SCHEDULED MEDS/DHE, NO N/T, DID O2 THERAPY WITH MASK AT 15 L NONREBR, WALKED IN [facility].      Problem: PAIN - ADULT  Goal: Verbalizes/displays adequate comfort level or patient's sta

## 2020-02-05 NOTE — RESPIRATORY THERAPY NOTE
DICK - Equipment Use Daily Summary:                  . Set Mode: CPAP                . Usage in hours: 1:48                . 90% Pressure (EPAP) level: 7.0                . 90% Insp. Pressure (IPAP): Shu Spar AHI: 0                .  Supplemental Oxyg

## 2020-02-05 NOTE — PAYOR COMM NOTE
--------------  CONTINUED STAY REVIEW    Payor: [employer]  Subscriber #:  [id number]  Authorization Number: [id number]            MEDICATIONS ADMINISTERED IN LAST 1 DAY:  ALPRAZolam Vegaillia Osgood) tab 0.25 mg     Date Action Dose Route User    2/5/2020 7512 Give Action Dose Route User    2/5/2020 7601 Given 40 mg Oral Marielle Cuba RN      melatonin cap/tab 10 mg     Date Action Dose Route User    2/4/2020 2248 Given 10 mg Oral Angel Mendoza RN      methylPREDNISolone Sodium Succ (Solu-MEDROL) injection 40 mg labs have been ordered for 2 weeks     ROS:   GENERAL HEALTH: feels well otherwise  SKIN: denies any unusual skin lesions or rashes  EYES: no visual complaints or deficits  HEENT: denies nasal congestion, sinus pain or sore throat; hearing loss negative  R
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
## 2020-02-05 NOTE — PROGRESS NOTES
Delfin Jeffries is a 47year old male. No chief complaint on file.     HPI:    Pt here for cluster/migraine  After 6 doses of DHE and iv solumedrol, he says this is the best he has scout since October  He will see [facility] and then be dc later today  He is JOSE cough   CARDIOVASCULAR: denies chest pain or KENDRICK; no palpitations   GI: denies nausea, vomiting, constipation, diarrhea; no rectal bleeding; no heartburn  GENITAL/: no dysuria, urgency or frequency;  MUSCULOSKELETAL: no joint complaints upper or lower ex cluster/migraine  Add depakote 250 bid prn ha  Start emgality  Samples of ubrelvy-wife to  at office today  2) temple pain.  Does have mild crp elevation, but DHE is what really helped his pain so very unlikely to be TA  However, did let pt know that

## 2020-02-05 NOTE — PLAN OF CARE
Patient reports improved headache with prescribed medication  Does still report photophobia and some increase in headache with ocular movement  Oxygen therapy administered once as per patient request  Patient is AOx4; anxious at times  Emotional support of DISPLAY PLAN FREE TEXT

## 2020-02-06 NOTE — PAYOR COMM NOTE
--------------  ADMISSION REVIEW     Secondary Payor: [facility]  Subscriber #:  [id number]  Authorization Number: [id number]    Admit date: 2/3/20  Admit time: 1       Admitting Physician: Susu Orellana MD  Primary Care Physician: Baylee Jin

## 2020-02-06 NOTE — BH PROGRESS NOTE
Seen the pt for the [facility] level of care assessment. Dr. Cristy Whittington is recommending the pt to attend the [address] [place] [address]  PHP starting tomorrow. The pt agrees and all needed forms filled out.

## 2020-02-07 NOTE — DISCHARGE SUMMARY
[facility] Internal Medicine Discharge Summary   Patient ID:  Lety Alonzo  [id number]  47year old  2/11/1970    Admit date: 2/3/2020    Discharge date and time: 2/5/2020     Attending Physician: No att. providers found     Primary Care Physician: Ruth Garcia PTSD;   Seen by psychiatry during admit  Currently no SI/HI     DICK per protocol     HTN meds ordered     Discharge when ok with consultants    Patient instructed to seek medical attention if symptoms return, do not improve or get worse.  Pt expressed under Vyvanse  Take 1 capsule (30 mg total) by mouth daily. lisinopril 40 MG Tabs  Take 1 tablet (40 mg total) by mouth daily.      MAGNESIUM GLYCINATE PLUS OR     Melatonin 10 MG Caps     methylphenidate 10 MG Tabs  Take 10 mg in the afternoon as needed for COMPARISON:  EDWARD , CT, CTA BRAIN + CTA CAROTIDS (QMA=36213/19908), 11/03/2019, 1:35.   INDICATIONS:  new daily headache  TECHNIQUE:  MR angiography of the brain without infusion was performed using 3D time of flight, multi-planar and 3D reconstructed morelia confusion, hallucinations, , Increased heart rate  High blood pressure, Loss of muscle coordination or twitching muscles, Muscle rigidity        Total Time Coordinating Care: Greater than 30 minutes    Patient had opportunity to ask questions and state und

## 2020-03-14 ENCOUNTER — APPOINTMENT (OUTPATIENT)
Dept: GENERAL RADIOLOGY | Age: 50
End: 2020-03-14
Attending: EMERGENCY MEDICINE
Payer: COMMERCIAL

## 2020-03-14 ENCOUNTER — HOSPITAL ENCOUNTER (EMERGENCY)
Age: 50
Discharge: HOME | End: 2020-03-14
Attending: EMERGENCY MEDICINE
Payer: COMMERCIAL

## 2020-03-14 VITALS
OXYGEN SATURATION: 98 % | DIASTOLIC BLOOD PRESSURE: 69 MMHG | HEIGHT: 63 IN | TEMPERATURE: 98 F | BODY MASS INDEX: 30.12 KG/M2 | HEART RATE: 67 BPM | SYSTOLIC BLOOD PRESSURE: 114 MMHG | RESPIRATION RATE: 18 BRPM | WEIGHT: 170 LBS

## 2020-03-14 DIAGNOSIS — J02.9 VIRAL PHARYNGITIS: ICD-10-CM

## 2020-03-14 DIAGNOSIS — K12.2 UVULITIS: Primary | ICD-10-CM

## 2020-03-14 LAB
ANION GAP SERPL CALC-SCNC: 5 MMOL/L (ref 0–18)
BASOPHILS # BLD AUTO: 0.05 X10(3) UL (ref 0–0.2)
BASOPHILS NFR BLD AUTO: 0.7 %
BUN BLD-MCNC: 15 MG/DL (ref 7–18)
BUN/CREAT SERPL: 17.9 (ref 10–20)
CALCIUM BLD-MCNC: 8.5 MG/DL (ref 8.5–10.1)
CHLORIDE SERPL-SCNC: 105 MMOL/L (ref 98–112)
CO2 SERPL-SCNC: 29 MMOL/L (ref 21–32)
CREAT BLD-MCNC: 0.84 MG/DL (ref 0.7–1.3)
DEPRECATED RDW RBC AUTO: 41.1 FL (ref 35.1–46.3)
EOSINOPHIL # BLD AUTO: 0.14 X10(3) UL (ref 0–0.7)
EOSINOPHIL NFR BLD AUTO: 1.8 %
ERYTHROCYTE [DISTWIDTH] IN BLOOD BY AUTOMATED COUNT: 12.7 % (ref 11–15)
GLUCOSE BLD-MCNC: 127 MG/DL (ref 70–99)
HCT VFR BLD AUTO: 40.8 % (ref 39–53)
HGB BLD-MCNC: 13.6 G/DL (ref 13–17.5)
IMM GRANULOCYTES # BLD AUTO: 0.05 X10(3) UL (ref 0–1)
IMM GRANULOCYTES NFR BLD: 0.7 %
LYMPHOCYTES # BLD AUTO: 2.13 X10(3) UL (ref 1–4)
LYMPHOCYTES NFR BLD AUTO: 28.1 %
MCH RBC QN AUTO: 29.2 PG (ref 26–34)
MCHC RBC AUTO-ENTMCNC: 33.3 G/DL (ref 31–37)
MCV RBC AUTO: 87.7 FL (ref 80–100)
MONOCYTES # BLD AUTO: 0.93 X10(3) UL (ref 0.1–1)
MONOCYTES NFR BLD AUTO: 12.3 %
NEUTROPHILS # BLD AUTO: 4.27 X10 (3) UL (ref 1.5–7.7)
NEUTROPHILS # BLD AUTO: 4.27 X10(3) UL (ref 1.5–7.7)
NEUTROPHILS NFR BLD AUTO: 56.4 %
OSMOLALITY SERPL CALC.SUM OF ELEC: 290 MOSM/KG (ref 275–295)
PLATELET # BLD AUTO: 145 10(3)UL (ref 150–450)
POTASSIUM SERPL-SCNC: 3.4 MMOL/L (ref 3.5–5.1)
RBC # BLD AUTO: 4.65 X10(6)UL (ref 4.3–5.7)
SODIUM SERPL-SCNC: 139 MMOL/L (ref 136–145)
WBC # BLD AUTO: 7.6 X10(3) UL (ref 4–11)

## 2020-03-14 PROCEDURE — 96375 TX/PRO/DX INJ NEW DRUG ADDON: CPT

## 2020-03-14 PROCEDURE — 99284 EMERGENCY DEPT VISIT MOD MDM: CPT

## 2020-03-14 PROCEDURE — 85025 COMPLETE CBC W/AUTO DIFF WBC: CPT | Performed by: EMERGENCY MEDICINE

## 2020-03-14 PROCEDURE — 96361 HYDRATE IV INFUSION ADD-ON: CPT

## 2020-03-14 PROCEDURE — 80048 BASIC METABOLIC PNL TOTAL CA: CPT | Performed by: EMERGENCY MEDICINE

## 2020-03-14 PROCEDURE — 70360 X-RAY EXAM OF NECK: CPT | Performed by: EMERGENCY MEDICINE

## 2020-03-14 PROCEDURE — 96374 THER/PROPH/DIAG INJ IV PUSH: CPT

## 2020-03-14 RX ORDER — KETOROLAC TROMETHAMINE 30 MG/ML
30 INJECTION, SOLUTION INTRAMUSCULAR; INTRAVENOUS ONCE
Status: COMPLETED | OUTPATIENT
Start: 2020-03-14 | End: 2020-03-14

## 2020-03-14 RX ORDER — DEXAMETHASONE SODIUM PHOSPHATE 4 MG/ML
10 VIAL (ML) INJECTION ONCE
Status: COMPLETED | OUTPATIENT
Start: 2020-03-14 | End: 2020-03-14

## 2020-03-14 RX ORDER — SODIUM CHLORIDE 9 MG/ML
INJECTION, SOLUTION INTRAVENOUS CONTINUOUS
Status: DISCONTINUED | OUTPATIENT
Start: 2020-03-14 | End: 2020-03-14

## 2020-03-14 RX ORDER — MORPHINE SULFATE 4 MG/ML
2 INJECTION, SOLUTION INTRAMUSCULAR; INTRAVENOUS ONCE
Status: COMPLETED | OUTPATIENT
Start: 2020-03-14 | End: 2020-03-14

## 2020-03-14 NOTE — ED INITIAL ASSESSMENT (HPI)
Pt c/o sore throat, painful to swallow, and tender to neck by his lymph nodes. No vanna. PWD. Pt with clear speech.

## 2020-05-15 ENCOUNTER — APPOINTMENT (OUTPATIENT)
Dept: CT IMAGING | Facility: HOSPITAL | Age: 50
End: 2020-05-15
Attending: PHYSICIAN ASSISTANT
Payer: COMMERCIAL

## 2020-05-15 ENCOUNTER — HOSPITAL ENCOUNTER (OUTPATIENT)
Facility: HOSPITAL | Age: 50
Setting detail: OBSERVATION
Discharge: HOME | End: 2020-05-17
Attending: EMERGENCY MEDICINE | Admitting: INTERNAL MEDICINE
Payer: COMMERCIAL

## 2020-05-15 DIAGNOSIS — G44.009 CLUSTER HEADACHE, NOT INTRACTABLE, UNSPECIFIED CHRONICITY PATTERN: Primary | ICD-10-CM

## 2020-05-15 PROBLEM — G44.001 CLUSTER HEADACHE SYNDROME, INTRACTABLE: Status: ACTIVE | Noted: 2020-05-15

## 2020-05-15 PROCEDURE — 99285 EMERGENCY DEPT VISIT HI MDM: CPT

## 2020-05-15 PROCEDURE — 83735 ASSAY OF MAGNESIUM: CPT | Performed by: PHYSICIAN ASSISTANT

## 2020-05-15 PROCEDURE — 85025 COMPLETE CBC W/AUTO DIFF WBC: CPT | Performed by: PHYSICIAN ASSISTANT

## 2020-05-15 PROCEDURE — 36415 COLL VENOUS BLD VENIPUNCTURE: CPT

## 2020-05-15 PROCEDURE — 81003 URINALYSIS AUTO W/O SCOPE: CPT | Performed by: PHYSICIAN ASSISTANT

## 2020-05-15 PROCEDURE — 85652 RBC SED RATE AUTOMATED: CPT | Performed by: PHYSICIAN ASSISTANT

## 2020-05-15 PROCEDURE — 96375 TX/PRO/DX INJ NEW DRUG ADDON: CPT

## 2020-05-15 PROCEDURE — 96374 THER/PROPH/DIAG INJ IV PUSH: CPT

## 2020-05-15 PROCEDURE — 96372 THER/PROPH/DIAG INJ SC/IM: CPT

## 2020-05-15 PROCEDURE — 80053 COMPREHEN METABOLIC PANEL: CPT | Performed by: PHYSICIAN ASSISTANT

## 2020-05-15 PROCEDURE — 70450 CT HEAD/BRAIN W/O DYE: CPT | Performed by: PHYSICIAN ASSISTANT

## 2020-05-15 RX ORDER — AMLODIPINE BESYLATE AND BENAZEPRIL HYDROCHLORIDE 10; 40 MG/1; MG/1
1 CAPSULE ORAL DAILY
Status: DISCONTINUED | OUTPATIENT
Start: 2020-05-15 | End: 2020-05-15 | Stop reason: RX

## 2020-05-15 RX ORDER — CYCLOBENZAPRINE HCL 10 MG
TABLET ORAL NIGHTLY
COMMUNITY
End: 2020-07-10

## 2020-05-15 RX ORDER — DIVALPROEX SODIUM 250 MG/1
250 TABLET, DELAYED RELEASE ORAL 2 TIMES DAILY PRN
Status: DISCONTINUED | OUTPATIENT
Start: 2020-05-15 | End: 2020-05-15

## 2020-05-15 RX ORDER — DIAZEPAM 10 MG/1
10 TABLET ORAL NIGHTLY PRN
Status: DISCONTINUED | OUTPATIENT
Start: 2020-05-15 | End: 2020-05-17

## 2020-05-15 RX ORDER — DEXTROAMPHETAMINE SACCHARATE, AMPHETAMINE ASPARTATE, DEXTROAMPHETAMINE SULFATE AND AMPHETAMINE SULFATE 2.5; 2.5; 2.5; 2.5 MG/1; MG/1; MG/1; MG/1
20 TABLET ORAL
Status: DISCONTINUED | OUTPATIENT
Start: 2020-05-16 | End: 2020-05-17

## 2020-05-15 RX ORDER — ALBUTEROL SULFATE 90 UG/1
2 AEROSOL, METERED RESPIRATORY (INHALATION) EVERY 4 HOURS PRN
Status: DISCONTINUED | OUTPATIENT
Start: 2020-05-15 | End: 2020-05-17

## 2020-05-15 RX ORDER — ACETAMINOPHEN 325 MG/1
650 TABLET ORAL EVERY 6 HOURS PRN
Status: DISCONTINUED | OUTPATIENT
Start: 2020-05-15 | End: 2020-05-17

## 2020-05-15 RX ORDER — HEPARIN SODIUM 5000 [USP'U]/ML
5000 INJECTION, SOLUTION INTRAVENOUS; SUBCUTANEOUS EVERY 8 HOURS SCHEDULED
Status: DISCONTINUED | OUTPATIENT
Start: 2020-05-15 | End: 2020-05-17

## 2020-05-15 RX ORDER — POLYETHYLENE GLYCOL 3350 17 G/17G
17 POWDER, FOR SOLUTION ORAL DAILY PRN
Status: DISCONTINUED | OUTPATIENT
Start: 2020-05-15 | End: 2020-05-17

## 2020-05-15 RX ORDER — PANTOPRAZOLE SODIUM 40 MG/1
40 TABLET, DELAYED RELEASE ORAL
Status: DISCONTINUED | OUTPATIENT
Start: 2020-05-16 | End: 2020-05-17

## 2020-05-15 RX ORDER — SODIUM CHLORIDE 9 MG/ML
INJECTION, SOLUTION INTRAVENOUS CONTINUOUS
Status: DISCONTINUED | OUTPATIENT
Start: 2020-05-15 | End: 2020-05-17

## 2020-05-15 RX ORDER — METOCLOPRAMIDE HYDROCHLORIDE 5 MG/ML
10 INJECTION INTRAMUSCULAR; INTRAVENOUS EVERY 8 HOURS
Status: COMPLETED | OUTPATIENT
Start: 2020-05-15 | End: 2020-05-17

## 2020-05-15 RX ORDER — DOCUSATE SODIUM 100 MG/1
100 CAPSULE, LIQUID FILLED ORAL 2 TIMES DAILY
Status: DISCONTINUED | OUTPATIENT
Start: 2020-05-15 | End: 2020-05-17

## 2020-05-15 RX ORDER — PROPRANOLOL HYDROCHLORIDE 10 MG/1
10 TABLET ORAL 3 TIMES DAILY
Status: DISCONTINUED | OUTPATIENT
Start: 2020-05-15 | End: 2020-05-17

## 2020-05-15 RX ORDER — TOPIRAMATE 25 MG/1
100 TABLET ORAL NIGHTLY
Status: DISCONTINUED | OUTPATIENT
Start: 2020-05-15 | End: 2020-05-17

## 2020-05-15 RX ORDER — BISACODYL 10 MG
10 SUPPOSITORY, RECTAL RECTAL
Status: DISCONTINUED | OUTPATIENT
Start: 2020-05-15 | End: 2020-05-17

## 2020-05-15 RX ORDER — BUSPIRONE HYDROCHLORIDE 5 MG/1
15 TABLET ORAL 3 TIMES DAILY
Status: DISCONTINUED | OUTPATIENT
Start: 2020-05-15 | End: 2020-05-17

## 2020-05-15 RX ORDER — MIRTAZAPINE 15 MG/1
45 TABLET, FILM COATED ORAL NIGHTLY
Status: DISCONTINUED | OUTPATIENT
Start: 2020-05-15 | End: 2020-05-17

## 2020-05-15 RX ORDER — SODIUM PHOSPHATE, DIBASIC AND SODIUM PHOSPHATE, MONOBASIC 7; 19 G/133ML; G/133ML
1 ENEMA RECTAL ONCE AS NEEDED
Status: DISCONTINUED | OUTPATIENT
Start: 2020-05-15 | End: 2020-05-17

## 2020-05-15 RX ORDER — TAMSULOSIN HYDROCHLORIDE 0.4 MG/1
0.4 CAPSULE ORAL DAILY
Status: DISCONTINUED | OUTPATIENT
Start: 2020-05-15 | End: 2020-05-17

## 2020-05-15 RX ORDER — LISINOPRIL 40 MG/1
40 TABLET ORAL DAILY
Status: DISCONTINUED | OUTPATIENT
Start: 2020-05-15 | End: 2020-05-15

## 2020-05-15 RX ORDER — DICYCLOMINE HCL 20 MG
20 TABLET ORAL
Status: DISCONTINUED | OUTPATIENT
Start: 2020-05-15 | End: 2020-05-17

## 2020-05-15 RX ORDER — ONDANSETRON 2 MG/ML
4 INJECTION INTRAMUSCULAR; INTRAVENOUS EVERY 6 HOURS PRN
Status: DISCONTINUED | OUTPATIENT
Start: 2020-05-15 | End: 2020-05-17

## 2020-05-15 RX ORDER — METHYLPREDNISOLONE SODIUM SUCCINATE 125 MG/2ML
125 INJECTION, POWDER, LYOPHILIZED, FOR SOLUTION INTRAMUSCULAR; INTRAVENOUS EVERY 8 HOURS
Status: COMPLETED | OUTPATIENT
Start: 2020-05-16 | End: 2020-05-17

## 2020-05-15 RX ORDER — BACLOFEN 10 MG/1
10 TABLET ORAL NIGHTLY
Status: DISCONTINUED | OUTPATIENT
Start: 2020-05-15 | End: 2020-05-17

## 2020-05-15 RX ORDER — METOCLOPRAMIDE HYDROCHLORIDE 5 MG/ML
10 INJECTION INTRAMUSCULAR; INTRAVENOUS EVERY 8 HOURS PRN
Status: DISCONTINUED | OUTPATIENT
Start: 2020-05-15 | End: 2020-05-17

## 2020-05-15 RX ORDER — GABAPENTIN 400 MG/1
800 CAPSULE ORAL 4 TIMES DAILY
Status: DISCONTINUED | OUTPATIENT
Start: 2020-05-15 | End: 2020-05-17

## 2020-05-15 RX ORDER — METHYLPREDNISOLONE SODIUM SUCCINATE 125 MG/2ML
125 INJECTION, POWDER, LYOPHILIZED, FOR SOLUTION INTRAMUSCULAR; INTRAVENOUS EVERY 8 HOURS
Status: DISCONTINUED | OUTPATIENT
Start: 2020-05-15 | End: 2020-05-15

## 2020-05-15 RX ORDER — DIPHENHYDRAMINE HYDROCHLORIDE 50 MG/ML
25 INJECTION INTRAMUSCULAR; INTRAVENOUS EVERY 8 HOURS
Status: COMPLETED | OUTPATIENT
Start: 2020-05-15 | End: 2020-05-17

## 2020-05-15 NOTE — ED INITIAL ASSESSMENT (HPI)
PT presents with headache, had phone appointment with neurologist on 4/22. PT has history of headaches for which he has received infusions that has helped.  PT also c/o light sensitivity

## 2020-05-16 PROCEDURE — 85025 COMPLETE CBC W/AUTO DIFF WBC: CPT | Performed by: HOSPITALIST

## 2020-05-16 PROCEDURE — 80053 COMPREHEN METABOLIC PANEL: CPT | Performed by: HOSPITALIST

## 2020-05-16 PROCEDURE — 94660 CPAP INITIATION&MGMT: CPT

## 2020-05-16 PROCEDURE — 96372 THER/PROPH/DIAG INJ SC/IM: CPT

## 2020-05-16 PROCEDURE — 96376 TX/PRO/DX INJ SAME DRUG ADON: CPT

## 2020-05-16 RX ORDER — ACETAMINOPHEN AND CODEINE PHOSPHATE 300; 30 MG/1; MG/1
1 TABLET ORAL EVERY 4 HOURS PRN
Status: DISCONTINUED | OUTPATIENT
Start: 2020-05-16 | End: 2020-05-16

## 2020-05-16 NOTE — ED NOTES
Pt walked to restroom with steady gait. Pt requesting to use 15l O2 for his cluster headaches as this is what he uses at home. Dr Alpa Peng informed, vo to use O2.

## 2020-05-16 NOTE — PLAN OF CARE
NURSING ADMISSION NOTE      Patient admitted via Cart  Oriented to room. Safety precautions initiated. Bed in low position. Call light in reach. Patient is A&O x4.   Calm and cooperative. VSS. On tele-NSR. On RA. DICK-pt's own CPAP at bedside.

## 2020-05-16 NOTE — PROGRESS NOTES
45 yr old male with refractory headache being admitted by outpatient neurologist, Dr. Matheus Franklin for inpatient DHE treatment.     Plan:  -Start DHE 0.5 mg Q8hrs with IV benadryl and Reglan premedication  -125 mg solumedrol Q8hrs between dose of DHE  -Telemetry m

## 2020-05-16 NOTE — ED PROVIDER NOTES
Patient Seen in: [facility] Emergency Department      History   Patient presents with:  Headache    Stated Complaint: Pt c/o intractable cluster headaches, referred to ER by Dr. Roberto Carlos Torres, Neuro*    69-year-old  male with a history of an POSSIBLE POLYPECTOMY [id number], [id number] N/A 10/16/2012    Performed by Danita Gastelum MD at [address] MASS Left 4/17/2019    Performed by Wendi Nova MD at [address] intractable cluster headaches, referred to ER by Dr. Simran Gorman, Neuro*  Other systems are as noted in HPI. Constitutional and vital signs reviewed. All other systems reviewed and negative except as noted above.     Physical Exam     ED Triage Vital No weakness.       Coordination: Coordination normal.   Psychiatric:         Mood and Affect: Mood normal.             ED Course     Labs Reviewed   COMP METABOLIC PANEL (14) - Abnormal; Notable for the following components:       Result Value    Glucose 13 is transmitted to the ACR ([facility]) [address]) which includes the Dose Index Registry. PATIENT STATED HISTORY: (As transcribed by Technologist)  Headache and photosensitivity. History of headaches.    FIN

## 2020-05-16 NOTE — PLAN OF CARE
Patient alert and oriented x4. Anxious. On RA.  NSR on tele. Patient complains of HA. DHE protocol. IV solumedrol q8. IVF. Resting comfortably in bed. Will continue to monitor.

## 2020-05-16 NOTE — CONSULTS
[facility] 70     Patient name:  Leonie Argueta   :    1970  MRN:   [id number]   Location:  [facility] 3NE-A  Attending:  Tamara Amaya, * Date of Admission:   5/15/2020    HISTORY OF PRESENT ILLNESS DME update:  [facility] CPAP 11CWP     Past Surgical History:   Procedure Laterality Date   • COLONOSCOPY  10/12    normal; repeat 10 yrs   • ESOPHAGOGASTRODUODENOSCOPY, COLONOSCOPY, POSSIBLE BIOPSY, POSSIBLE POLYPECTOMY 13448, 13273 N/A 10/16/2012    Perf Mother         srojgrens   • Diabetes Maternal Grandmother    • Heart Disorder Maternal Grandmother    • Heart Disorder Other         early CAD in uncles   • Other (Other) Other         Sjogren's, RA   • Heart Disorder Maternal Grandfather    • Diabetes Pa Propranolol HCl  10 mg Oral TID   • Pantoprazole Sodium  40 mg Oral QAM AC   • Heparin Sodium (Porcine)  5,000 Units Subcutaneous Q8H [facility]   • docusate sodium  100 mg Oral BID   • MethylPREDNISolone Sodium Succ  125 mg Intravenous Q8H     Continuous Infusio touch and pinprick throughout  Coordination- FTN and HTN intact bilaterally  Gait- normal based, no ataxia    LABS:     Recent Labs   Lab 05/15/20  1904 05/16/20  0603   WBC 5.8 7.2   HGB 14.9 14.8   MCV 85.1 87.0   .0 173.0     Recent Labs   Lab 05

## 2020-05-16 NOTE — H&P
[facility] Hospitalist H&P       CC: cluster HA    PCP: Kamran Clark MD    History of Present Illness: Pt is a 46 yo with mmp including but not limtied to HTN/HL, chronic pelivic pain, fibromyalgia, IBS, DICK, cluster headches is admitted for cluster HISTORY      left knee x 2   • OTHER SURGICAL HISTORY      S/P Right scrotal exploration with revision of right vasectomy and excision of prominent right appendix testicle 4/18/17   • OTHER SURGICAL HISTORY  2015    S/ P Radical left orchiectomy 2015    • ALPRAZolam ER 1 MG Oral Tablet 24 Hr, Take 1 tablet (1 mg total) by mouth daily as needed. , Disp: 30 tablet, Rfl: 0  Propranolol HCl 10 MG Oral Tab, Take 1 tablet (10 mg total) by mouth 3 (three) times daily. , Disp: 90 tablet, Rfl: 0  amphetamine-dextroa MG Oral Tab EC, Take 1 tablet (40 mg total) by mouth every morning before breakfast., Disp: 90 tablet, Rfl: 3  MAGNESIUM GLYCINATE PLUS OR, Take by mouth daily. , Disp: , Rfl:           Soc Hx  Social History    Tobacco Use      Smoking status: Never Smoker focal deficits  appropriate affect,  memory intact     Diagnostic Data:    CBC/Chem  Recent Labs   Lab 05/15/20  1904 05/16/20  0603   WBC 5.8 7.2   HGB 14.9 14.8   MCV 85.1 87.0   .0 173.0       Recent Labs   Lab 05/15/20  1904 05/16/20  0603   NA

## 2020-05-17 VITALS
HEIGHT: 64 IN | HEART RATE: 65 BPM | BODY MASS INDEX: 32.8 KG/M2 | SYSTOLIC BLOOD PRESSURE: 132 MMHG | RESPIRATION RATE: 18 BRPM | WEIGHT: 192.13 LBS | OXYGEN SATURATION: 98 % | TEMPERATURE: 98 F | DIASTOLIC BLOOD PRESSURE: 91 MMHG

## 2020-05-17 PROCEDURE — 96376 TX/PRO/DX INJ SAME DRUG ADON: CPT

## 2020-05-17 PROCEDURE — 96372 THER/PROPH/DIAG INJ SC/IM: CPT

## 2020-05-17 NOTE — RESPIRATORY THERAPY NOTE
DICK - Equipment Use Daily Summary:                  . Set Mode: AUTO CPAP WITH C-FLEX                . Usage in hours: 3:18                . 90% Pressure (EPAP) level: 9.6                . 90% Insp. Pressure (IPAP): Linda Frost  AHI: 1.5

## 2020-05-17 NOTE — PROGRESS NOTES
[facility] Hospitalist Progress Note     PCP: Tammie Rivas MD    CC:  Follow up    SUBJECTIVE:  Pt sitting up in bed, WHITMAN improved from yesterday. Now 4/10. No other complaints.    OBJECTIVE:  Temp:  [97.8 °F (36.6 °C)-98.2 °F (36.8 °C)] 98.1 °F (36.7 °C Oral Nightly   • Propranolol HCl  10 mg Oral TID   • Pantoprazole Sodium  40 mg Oral QAM AC   • Heparin Sodium (Porcine)  5,000 Units Subcutaneous Q8H [address]   • docusate sodium  100 mg Oral BID   • MethylPREDNISolone Sodium Succ  125 mg Intravenous Q8H     •

## 2020-05-17 NOTE — PLAN OF CARE
Assumed patient care at 0730 this AM. Patient A&O x4. Reports 4/10 pain to right side of face/ eye, managing with DHE Q8 and IV solumedrol Q8. Pt also reports photosensitivity, sunglasses at bedside. SPO2 maintained on RA, no c/o SOB.  NSR on tele, heparin neurological status  - Initiate measures to prevent increased intracranial pressure  - Maintain blood pressure and fluid volume within ordered parameters to optimize cerebral perfusion and minimize risk of hemorrhage  - Monitor temperature, glucose, and so

## 2020-05-17 NOTE — PROGRESS NOTES
[facility] - NEUROLOGY PROGRESS NOTE     SUBJECTIVE:     Interval History: No events reported overnight. Reports significant improvement in his headache with the DHE. No new neurological symptoms reported.     OBJECTIVE   Temp:  [97.7 °F (36.5 °C MethylPREDNISolone Sodium Succ  125 mg Intravenous Q8H     Continuous Infusions:  • sodium chloride 100 mL/hr at 05/17/20 0617     PRN Meds:  ubrogepant, Albuterol Sulfate HFA, diazepam, acetaminophen, PEG 3350, magnesium hydroxide, bisacodyl, Fleet Enema,

## 2020-05-17 NOTE — PROGRESS NOTES
Explained discharge instructions including medications and follow ups to the patient, pt's home medications returned from pt bin and pharmacy, verbalize understanding, PIV removed, tele monitor discontinued, will be transported via wheelchair.

## 2020-05-17 NOTE — PLAN OF CARE
Assumed patient care at 1900  Patient A&O x 4  Complaints of head pain. Stating still mainly behind (R) eye. States improving over all. Rating pain a 4/10.  DHE protocol per Neurology  VSS  Tele-SR  Heparin Sub Q  DICK CPAP at [facility] in pharmacy status  Description  INTERVENTIONS  - Assess for and report changes in neurological status  - Initiate measures to prevent increased intracranial pressure  - Maintain blood pressure and fluid volume within ordered parameters to optimize cerebral perfusion

## 2020-05-17 NOTE — DISCHARGE SUMMARY
General Medicine Discharge Summary     Patient ID:  Rohit Yu  45year old  [id number]  2/11/1970    Admit date: 5/15/2020    Discharge date and time: 5/17/2020  6:00 PM     Attending Physician: Ade Clemente MD    Primary Care Physician: [facility] DARIEL SHELLEY FINDINGS: No evidence of hemorrhage or extra-axial fluid collection. Supra and infratentorial brain parenchyma are unremarkable. Ventricles and sulci are appropriate for the patient's age. No mass effect.   Visualized portions of paranasal sinuses are un daily before meals and nightly., Normal, Disp-40 tablet, R-1    divalproex Sodium (DEPAKOTE) 250 MG Oral Tab EC DR tab  Take 1 tablet (250 mg total) by mouth 2 (two) times daily as needed (headache). , Normal, Disp-90 tablet, R-1    mirtazapine 45 MG Oral T HEADACHE AND REPEAT EVERY 2 HOURS AS NEEDED. MAX OF 4 TABLETS IN 24 HOURS, Normal, Disp-9 tablet, R-1    !! predniSONE 10 MG Oral Tab  Take 1 tablet (10 mg total) by mouth daily. , Normal, Disp-30 tablet, R-0    baclofen 10 MG Oral Tab  1 po hs, Normal, Dis You,  Serge Kumar MD    [facility] Hospitalist  Pager [phone]

## 2020-06-01 ENCOUNTER — HOSPITAL ENCOUNTER (EMERGENCY)
Age: 50
Discharge: HOME | End: 2020-06-01
Attending: EMERGENCY MEDICINE
Payer: COMMERCIAL

## 2020-06-01 VITALS
WEIGHT: 188 LBS | OXYGEN SATURATION: 98 % | RESPIRATION RATE: 20 BRPM | BODY MASS INDEX: 33.31 KG/M2 | TEMPERATURE: 99 F | HEART RATE: 97 BPM | DIASTOLIC BLOOD PRESSURE: 76 MMHG | HEIGHT: 63 IN | SYSTOLIC BLOOD PRESSURE: 125 MMHG

## 2020-06-01 DIAGNOSIS — R68.89 MULTIPLE COMPLAINTS: Primary | ICD-10-CM

## 2020-06-01 PROCEDURE — 99282 EMERGENCY DEPT VISIT SF MDM: CPT

## 2020-06-02 NOTE — ED INITIAL ASSESSMENT (HPI)
Pt reports head pain and numbness for the past couple months with increasing numbness to entire face in the past 2 wks. Pt states pain is behind eyes, head and neck but comes and goes. Pt reports burning to finger tips and toes.    Pt also reports muscle

## 2020-06-02 NOTE — ED PROVIDER NOTES
Patient Seen in: THE [facility] Emergency Department In [facility]      History   Patient presents with:  Numbness Weakness    Stated Complaint: numbness to face and head for several months     HPI    Patient was discharged from the hospital earlier this month. emergency department for evaluation.         Past Medical History:   Diagnosis Date   • ANXIETY    • Anxiety state    • Back pain     L5 and S1 disc deteriation   • Depression    • Esophageal reflux    • Essential hypertension    • Fibromyalgia    • [employer]   • REPAIR ING HERNIA,5+Y/O,REDUCIBL      s/p  ventral hernia repair   • SCROTAL EXPLORATION Right 4/18/2017    Performed by Michael Jeffries MD at [facility]   • SKIN SURGERY  12/22/2016    Exc - Left Lower Back r/o Lipoma   • SKIN SURGERY  1 S2, without murmur or rub. Distal pulses are strong and symmetric. Abdomen: Soft, little protuberant but nondistended and completely nontender  Extremities: Unremarkable. Calves nonswollen, symmetric, nontender. No ankle edema  Skin: Unremarkable.   No doctor regarding this. He can consider follow-up with rheumatology as well. As far as the ejaculation issue, urology consultation could be considered.         Disposition and Plan     Clinical Impression:  Multiple complaints  (primary encounter diagnosis

## 2020-06-07 ENCOUNTER — APPOINTMENT (OUTPATIENT)
Dept: GENERAL RADIOLOGY | Age: 50
End: 2020-06-07
Attending: PHYSICIAN ASSISTANT
Payer: COMMERCIAL

## 2020-06-07 ENCOUNTER — HOSPITAL ENCOUNTER (EMERGENCY)
Age: 50
Discharge: HOME | End: 2020-06-07
Attending: EMERGENCY MEDICINE
Payer: COMMERCIAL

## 2020-06-07 VITALS
HEIGHT: 63 IN | BODY MASS INDEX: 34.55 KG/M2 | DIASTOLIC BLOOD PRESSURE: 83 MMHG | OXYGEN SATURATION: 98 % | TEMPERATURE: 97 F | WEIGHT: 195 LBS | SYSTOLIC BLOOD PRESSURE: 106 MMHG | HEART RATE: 98 BPM | RESPIRATION RATE: 16 BRPM

## 2020-06-07 DIAGNOSIS — S61.219A FINGER LACERATION, INITIAL ENCOUNTER: Primary | ICD-10-CM

## 2020-06-07 PROCEDURE — 99283 EMERGENCY DEPT VISIT LOW MDM: CPT

## 2020-06-07 PROCEDURE — 12002 RPR S/N/AX/GEN/TRNK2.6-7.5CM: CPT

## 2020-06-07 PROCEDURE — 12002 RPR S/N/AX/GEN/TRNK2.6-7.5CM: CPT | Performed by: PHYSICIAN ASSISTANT

## 2020-06-07 PROCEDURE — 73140 X-RAY EXAM OF FINGER(S): CPT | Performed by: PHYSICIAN ASSISTANT

## 2020-06-07 RX ORDER — ONDANSETRON 4 MG/1
4 TABLET, ORALLY DISINTEGRATING ORAL ONCE
Status: COMPLETED | OUTPATIENT
Start: 2020-06-07 | End: 2020-06-07

## 2020-06-07 NOTE — ED PROVIDER NOTES
Patient Seen in: THE [facility] Emergency Department In [place]      History   Patient presents with:  Laceration Abrasion    Stated Complaint: lac left 5th digit    HPI    53-year-old male who fell while hanging a bird feeder and sliced open the left fifth d Performed by Amy Brambila MD at [address]   • ORCHIECTOMY       • OTHER SURGICAL HISTORY      neuroma   • OTHER SURGICAL HISTORY      left knee x 2   • OTHER SURGICAL HISTORY      S/P Right scrotal exploration with revision of ri Constitutional:       General: He is not in acute distress. Appearance: He is well-developed. He is not diaphoretic. HENT:      Head: Normocephalic and atraumatic. Neck:      Musculoskeletal: Normal range of motion.    Cardiovascular:      Rate and PROCEDURE:  XR FINGER(S) (MIN 2 VIEWS), LEFT 5TH (CPT=73140)  INDICATIONS:  lac left 5th digit  COMPARISON:  None. TECHNIQUE:  Three views of the finger were obtained.   PATIENT STATED HISTORY: (As transcribed by Technologist)  Patient injured his left 11th I reviewed laceration instructions with the patient, tube gauze was applied, patient was given a finger splint to go home with 14 days for suture removal wound check in 3 days.   I have also given him hand follow-up as there was an extensive laceration with

## 2020-07-25 PROBLEM — F33.2 MDD (MAJOR DEPRESSIVE DISORDER), RECURRENT EPISODE, SEVERE (HCC): Status: ACTIVE | Noted: 2020-07-25

## 2020-07-29 PROBLEM — G50.0 TRIGEMINAL NEURALGIA PAIN: Status: ACTIVE | Noted: 2020-07-29

## 2020-10-06 PROBLEM — M18.12 OSTEOARTHRITIS OF CARPOMETACARPAL (CMC) JOINT OF LEFT THUMB, UNSPECIFIED OSTEOARTHRITIS TYPE: Status: ACTIVE | Noted: 2020-10-06

## 2020-10-06 PROBLEM — M19.012 OSTEOARTHRITIS OF AC (ACROMIOCLAVICULAR) JOINTS, BILATERAL: Status: ACTIVE | Noted: 2020-10-06

## 2020-10-06 PROBLEM — M19.011 OSTEOARTHRITIS OF AC (ACROMIOCLAVICULAR) JOINTS, BILATERAL: Status: ACTIVE | Noted: 2020-10-06

## 2020-10-27 ENCOUNTER — TELEPHONE (OUTPATIENT)
Dept: PHYSICAL THERAPY | Facility: HOSPITAL | Age: 50
End: 2020-10-27

## 2020-10-28 ENCOUNTER — TELEPHONE (OUTPATIENT)
Dept: PHYSICAL THERAPY | Age: 50
End: 2020-10-28

## 2020-10-29 ENCOUNTER — OFFICE VISIT (OUTPATIENT)
Dept: SPEECH THERAPY | Facility: HOSPITAL | Age: 50
End: 2020-10-29
Attending: Other
Payer: COMMERCIAL

## 2020-10-29 ENCOUNTER — ORDER TRANSCRIPTION (OUTPATIENT)
Dept: PHYSICAL THERAPY | Age: 50
End: 2020-10-29

## 2020-10-29 DIAGNOSIS — T17.308A: ICD-10-CM

## 2020-10-29 PROCEDURE — 92610 EVALUATE SWALLOWING FUNCTION: CPT

## 2020-10-29 NOTE — PROGRESS NOTES
ADULT SWALLOWING EVALUATION:   Referring Physician: Dr. Sarkis Cruz  Diagnosis: Dysphagia (R13.10) Date of Service: 10/29/2020   Clinical swallow evaluation completed per MD order.   Patient arrived to session on time and participated actively during assessmen 60 tablet, Rfl: 0    •  escitalopram (LEXAPRO) 20 MG Oral Tab, Take 1 tablet (20 mg total) by mouth every morning., Disp: 30 tablet, Rfl: 2    •  amphetamine-dextroamphetamine (ADDERALL) 10 MG Oral Tab, 1 tab at am and noon, Disp: 60 tablet, Rfl: 0    •  e by mouth 3 (three) times daily. , Disp: 90 tablet, Rfl: 2    •  ALPRAZolam ER 1 MG Oral Tablet 24 Hr, Take 1 tablet (1 mg total) by mouth daily as needed. , Disp: 30 tablet, Rfl: 2    •  SUMAtriptan Succinate (IMITREX) 100 MG Oral Tab, 1 po when headache sta functional pharyngeal phase under parameters of assessment, showing no signs/symptoms of penetration/aspiration (eg: throat-clearing/couging, choking, wet/gurgly voice).   However, patient complained of fullness of throat during trials and reported sensatio Timed Treatment: 60 min    PLAN OF CARE:    Goals:  Long Term:   LTG 1: Patient will independently tolerate safest/least restrictive means of nutrition/hydration (6 months).                     Short Term:   STG 1: Patient will verbalize/demonstrate underst

## 2020-11-02 ENCOUNTER — APPOINTMENT (OUTPATIENT)
Dept: SPEECH THERAPY | Facility: HOSPITAL | Age: 50
End: 2020-11-02
Attending: Other
Payer: COMMERCIAL

## 2020-11-08 ENCOUNTER — OFFICE VISIT (OUTPATIENT)
Dept: FAMILY MEDICINE CLINIC | Facility: CLINIC | Age: 50
End: 2020-11-08
Payer: COMMERCIAL

## 2020-11-08 VITALS
OXYGEN SATURATION: 98 % | SYSTOLIC BLOOD PRESSURE: 139 MMHG | DIASTOLIC BLOOD PRESSURE: 94 MMHG | WEIGHT: 178 LBS | HEART RATE: 62 BPM | TEMPERATURE: 98 F | RESPIRATION RATE: 18 BRPM | BODY MASS INDEX: 32 KG/M2

## 2020-11-08 DIAGNOSIS — Z02.9 ADMINISTRATIVE ENCOUNTER: Primary | ICD-10-CM

## 2020-11-08 PROCEDURE — 3080F DIAST BP >= 90 MM HG: CPT | Performed by: NURSE PRACTITIONER

## 2020-11-08 PROCEDURE — 3075F SYST BP GE 130 - 139MM HG: CPT | Performed by: NURSE PRACTITIONER

## 2020-11-28 ENCOUNTER — HOSPITAL ENCOUNTER (EMERGENCY)
Age: 50
Discharge: HOME | End: 2020-11-28
Attending: EMERGENCY MEDICINE
Payer: COMMERCIAL

## 2020-11-28 VITALS
DIASTOLIC BLOOD PRESSURE: 74 MMHG | OXYGEN SATURATION: 98 % | WEIGHT: 177.94 LBS | TEMPERATURE: 99 F | HEART RATE: 64 BPM | SYSTOLIC BLOOD PRESSURE: 118 MMHG | BODY MASS INDEX: 32 KG/M2 | RESPIRATION RATE: 20 BRPM

## 2020-11-28 DIAGNOSIS — G43.009 MIGRAINE WITHOUT AURA AND WITHOUT STATUS MIGRAINOSUS, NOT INTRACTABLE: Primary | ICD-10-CM

## 2020-11-28 PROCEDURE — 96375 TX/PRO/DX INJ NEW DRUG ADDON: CPT

## 2020-11-28 PROCEDURE — 96374 THER/PROPH/DIAG INJ IV PUSH: CPT

## 2020-11-28 PROCEDURE — 99284 EMERGENCY DEPT VISIT MOD MDM: CPT

## 2020-11-28 RX ORDER — DIPHENHYDRAMINE HYDROCHLORIDE 50 MG/ML
50 INJECTION INTRAMUSCULAR; INTRAVENOUS ONCE
Status: COMPLETED | OUTPATIENT
Start: 2020-11-28 | End: 2020-11-28

## 2020-11-28 RX ORDER — METOCLOPRAMIDE HYDROCHLORIDE 5 MG/ML
10 INJECTION INTRAMUSCULAR; INTRAVENOUS ONCE
Status: COMPLETED | OUTPATIENT
Start: 2020-11-28 | End: 2020-11-28

## 2020-11-28 RX ORDER — DEXAMETHASONE SODIUM PHOSPHATE 4 MG/ML
10 VIAL (ML) INJECTION ONCE
Status: COMPLETED | OUTPATIENT
Start: 2020-11-28 | End: 2020-11-28

## 2020-11-28 RX ORDER — ONDANSETRON 2 MG/ML
4 INJECTION INTRAMUSCULAR; INTRAVENOUS ONCE
Status: COMPLETED | OUTPATIENT
Start: 2020-11-28 | End: 2020-11-28

## 2020-11-28 RX ORDER — SODIUM CHLORIDE 9 MG/ML
INJECTION, SOLUTION INTRAVENOUS
Status: COMPLETED
Start: 2020-11-28 | End: 2020-11-28

## 2020-11-28 RX ORDER — KETOROLAC TROMETHAMINE 30 MG/ML
30 INJECTION, SOLUTION INTRAMUSCULAR; INTRAVENOUS ONCE
Status: COMPLETED | OUTPATIENT
Start: 2020-11-28 | End: 2020-11-28

## 2020-11-28 NOTE — ED PROVIDER NOTES
Patient Seen in: THE [facility] Emergency Department In [place]      History   Patient presents with:  Headache    Stated Complaint: headache     HPI    58-year-old male comes to the hospital complaint of having difficulty with a headache.   Is a history of ha Bilateral 5/14/2018    Performed by Gricelda Bardales MD at . [facility] Bilateral 6/8/2018    Performed by Gricelda Bardales MD at [address] [place] [address]       • OTHER SURGICAL HISTORY (Temporal)   Resp 20   Wt 80.7 kg   SpO2 96%   BMI 31.52 kg/m²         Physical Exam    HEENT : NCAT, EOMI, PEERL, throat clear, neck supple, no JVD, trachea midline, No LAD  Heart: S1S2 normal. No murmurs, regular rate and rhythm  Lungs: Clear to ausculta patient's ER course. We discussed signs and symptoms that should prompt the patient's immediate return to the emergency department. Reasonable over the counter and prescription treatment options and Physician follow up plan was discussed.        The vikram

## 2020-12-14 ENCOUNTER — HOSPITAL ENCOUNTER (EMERGENCY)
Age: 50
Discharge: HOME | End: 2020-12-14
Attending: EMERGENCY MEDICINE
Payer: COMMERCIAL

## 2020-12-14 VITALS
WEIGHT: 175.06 LBS | OXYGEN SATURATION: 96 % | HEART RATE: 64 BPM | BODY MASS INDEX: 30 KG/M2 | TEMPERATURE: 99 F | SYSTOLIC BLOOD PRESSURE: 112 MMHG | RESPIRATION RATE: 16 BRPM | DIASTOLIC BLOOD PRESSURE: 68 MMHG

## 2020-12-14 DIAGNOSIS — G44.029 CHRONIC CLUSTER HEADACHE, NOT INTRACTABLE: Primary | ICD-10-CM

## 2020-12-14 PROCEDURE — 99284 EMERGENCY DEPT VISIT MOD MDM: CPT

## 2020-12-14 PROCEDURE — 96374 THER/PROPH/DIAG INJ IV PUSH: CPT

## 2020-12-14 PROCEDURE — 96375 TX/PRO/DX INJ NEW DRUG ADDON: CPT

## 2020-12-14 RX ORDER — TRAMADOL HYDROCHLORIDE 50 MG/1
TABLET ORAL EVERY 6 HOURS PRN
Qty: 10 TABLET | Refills: 0 | Status: SHIPPED | OUTPATIENT
Start: 2020-12-14 | End: 2020-12-21

## 2020-12-14 RX ORDER — DIPHENHYDRAMINE HYDROCHLORIDE 50 MG/ML
25 INJECTION INTRAMUSCULAR; INTRAVENOUS ONCE
Status: COMPLETED | OUTPATIENT
Start: 2020-12-14 | End: 2020-12-14

## 2020-12-14 RX ORDER — DEXAMETHASONE SODIUM PHOSPHATE 4 MG/ML
10 VIAL (ML) INJECTION ONCE
Status: COMPLETED | OUTPATIENT
Start: 2020-12-14 | End: 2020-12-14

## 2020-12-14 RX ORDER — KETOROLAC TROMETHAMINE 30 MG/ML
15 INJECTION, SOLUTION INTRAMUSCULAR; INTRAVENOUS ONCE
Status: COMPLETED | OUTPATIENT
Start: 2020-12-14 | End: 2020-12-14

## 2020-12-14 RX ORDER — ONDANSETRON 2 MG/ML
4 INJECTION INTRAMUSCULAR; INTRAVENOUS ONCE
Status: COMPLETED | OUTPATIENT
Start: 2020-12-14 | End: 2020-12-14

## 2020-12-14 RX ORDER — PREDNISONE 1 MG/1
5 TABLET ORAL DAILY
COMMUNITY
End: 2021-02-17 | Stop reason: ALTCHOICE

## 2020-12-15 NOTE — ED INITIAL ASSESSMENT (HPI)
Family states he's having a cluster headache for the past hour. Pt not speaking or answering questions. Arms twitching, eyes closed. No resp distress. Pt released form hospital 4 days ago for the same.

## 2020-12-15 NOTE — ED PROVIDER NOTES
Patient Seen in: [facility] Emergency Department In [place]      History   Patient presents with:  Headache    Stated Complaint: cluster migraine    HPI    79-year-old male presents emergency department who states he is having a cluster headache for the p NERVE BLOCK Bilateral 5/14/2018    Performed by Ashok Hawkins MD at . [address] Bilateral 6/8/2018    Performed by Ashok Hawkins MD at [address] [place] [address] %   O2 Device 12/14/20 1908 None (Room air)       Current:/68   Pulse 64   Temp 98.9 °F (37.2 °C) (Temporal)   Resp 16   Wt 79.4 kg   SpO2 96%   BMI 30.05 kg/m²         Physical Exam    All measures to prevent infection transmission during my interac follow-up with neurology        MDM      Patient was made aware of medical condition and instructed to take medications as prescribed. Patient is aware that they are to return to ED if any worsening problems.   Patient was also instructed to followup with

## 2021-01-13 PROBLEM — G89.11 ACUTE SHOULDER PAIN DUE TO TRAUMA, LEFT: Status: ACTIVE | Noted: 2021-01-13

## 2021-01-13 PROBLEM — M25.512 ACUTE SHOULDER PAIN DUE TO TRAUMA, LEFT: Status: ACTIVE | Noted: 2021-01-13

## 2021-03-09 PROBLEM — E44.0 MODERATE PROTEIN-CALORIE MALNUTRITION (HCC): Status: ACTIVE | Noted: 2020-08-26

## 2021-03-09 PROBLEM — R51.9 INTRACTABLE HEADACHE: Status: ACTIVE | Noted: 2020-12-05

## 2021-04-09 DIAGNOSIS — Z23 NEED FOR VACCINATION: ICD-10-CM

## 2021-06-21 ENCOUNTER — HOSPITAL ENCOUNTER (EMERGENCY)
Facility: HOSPITAL | Age: 51
Discharge: HOME | End: 2021-06-21
Attending: EMERGENCY MEDICINE
Payer: COMMERCIAL

## 2021-06-21 VITALS
HEIGHT: 63 IN | RESPIRATION RATE: 16 BRPM | DIASTOLIC BLOOD PRESSURE: 80 MMHG | SYSTOLIC BLOOD PRESSURE: 125 MMHG | HEART RATE: 62 BPM | TEMPERATURE: 98 F | WEIGHT: 170 LBS | BODY MASS INDEX: 30.12 KG/M2 | OXYGEN SATURATION: 99 %

## 2021-06-21 DIAGNOSIS — G44.029 CHRONIC CLUSTER HEADACHE, NOT INTRACTABLE: Primary | ICD-10-CM

## 2021-06-21 PROCEDURE — 99284 EMERGENCY DEPT VISIT MOD MDM: CPT

## 2021-06-21 PROCEDURE — 96375 TX/PRO/DX INJ NEW DRUG ADDON: CPT

## 2021-06-21 PROCEDURE — 96361 HYDRATE IV INFUSION ADD-ON: CPT

## 2021-06-21 PROCEDURE — 96374 THER/PROPH/DIAG INJ IV PUSH: CPT

## 2021-06-21 RX ORDER — KETOROLAC TROMETHAMINE 30 MG/ML
30 INJECTION, SOLUTION INTRAMUSCULAR; INTRAVENOUS ONCE
Status: COMPLETED | OUTPATIENT
Start: 2021-06-21 | End: 2021-06-21

## 2021-06-21 RX ORDER — METOCLOPRAMIDE HYDROCHLORIDE 5 MG/ML
5 INJECTION INTRAMUSCULAR; INTRAVENOUS ONCE
Status: COMPLETED | OUTPATIENT
Start: 2021-06-21 | End: 2021-06-21

## 2021-06-21 RX ORDER — HYDROMORPHONE HYDROCHLORIDE 1 MG/ML
0.5 INJECTION, SOLUTION INTRAMUSCULAR; INTRAVENOUS; SUBCUTANEOUS ONCE
Status: DISCONTINUED | OUTPATIENT
Start: 2021-06-21 | End: 2021-06-21

## 2021-06-21 RX ORDER — SODIUM CHLORIDE 9 MG/ML
1000 INJECTION, SOLUTION INTRAVENOUS ONCE
Status: COMPLETED | OUTPATIENT
Start: 2021-06-21 | End: 2021-06-21

## 2021-06-21 RX ORDER — DIPHENHYDRAMINE HYDROCHLORIDE 50 MG/ML
25 INJECTION INTRAMUSCULAR; INTRAVENOUS ONCE
Status: COMPLETED | OUTPATIENT
Start: 2021-06-21 | End: 2021-06-21

## 2021-06-21 NOTE — ED PROVIDER NOTES
Patient Seen in: [facility] Emergency Department      History   Patient presents with:  Headache    Stated Complaint: headache     HPI/Subjective:   HPI    Patient is a 76-year-old male presents emergency room with a history of migraine and cluster Procedure: ESOPHAGOGASTRODUODENOSCOPY, COLONOSCOPY, POSSIBLE BIOPSY, POSSIBLE POLYPECTOMY [id number];  Surgeon: Arturo Caicedo MD;  Location: [address] [place] [address]  10/12    normal   • ORCHIECTOMY       • OTHER SURGICAL HISTORY Wt 77.1 kg   SpO2 98%   BMI 30.11 kg/m²         Physical Exam  GENERAL: Well-developed, well-nourished male sitting up breathing easily in no apparent distress. HEENT: Head is normocephalic, atraumatic. Pupils are 4 mm equally round and reactive to light. IV Reglan, Benadryl, Toradol in the emergency room with improvement in symptoms after this was given. The patient had no further new complaints throughout the rest of the emergency room stay. Patient was observed for some time in the ER.   Patient felt mu

## 2021-06-21 NOTE — ED INITIAL ASSESSMENT (HPI)
Patient sts he's in the middle of a cluster migraine headache. Headache started 2 weeks ago and his migraine started 2 hours ago. Patient anand did not take his medications today.  Sensitivity to lights

## 2021-10-06 PROBLEM — S83.231A COMPLEX TEAR OF MEDIAL MENISCUS OF RIGHT KNEE AS CURRENT INJURY: Status: ACTIVE | Noted: 2021-10-06

## 2021-10-14 PROBLEM — R19.7 DIARRHEA: Status: ACTIVE | Noted: 2021-04-17

## 2021-10-14 PROBLEM — H57.13 EYE PAIN, BILATERAL: Status: ACTIVE | Noted: 2021-08-20

## 2021-10-24 ENCOUNTER — APPOINTMENT (OUTPATIENT)
Dept: GENERAL RADIOLOGY | Age: 51
End: 2021-10-24
Attending: EMERGENCY MEDICINE
Payer: COMMERCIAL

## 2021-10-24 ENCOUNTER — APPOINTMENT (OUTPATIENT)
Dept: CT IMAGING | Age: 51
End: 2021-10-24
Attending: EMERGENCY MEDICINE
Payer: COMMERCIAL

## 2021-10-24 ENCOUNTER — HOSPITAL ENCOUNTER (EMERGENCY)
Age: 51
Discharge: HOME | End: 2021-10-24
Attending: EMERGENCY MEDICINE
Payer: COMMERCIAL

## 2021-10-24 VITALS
OXYGEN SATURATION: 96 % | SYSTOLIC BLOOD PRESSURE: 119 MMHG | BODY MASS INDEX: 29.02 KG/M2 | WEIGHT: 170 LBS | RESPIRATION RATE: 20 BRPM | HEIGHT: 64 IN | HEART RATE: 66 BPM | TEMPERATURE: 98 F | DIASTOLIC BLOOD PRESSURE: 72 MMHG

## 2021-10-24 DIAGNOSIS — S06.0X0A CONCUSSION WITHOUT LOSS OF CONSCIOUSNESS, INITIAL ENCOUNTER: Primary | ICD-10-CM

## 2021-10-24 DIAGNOSIS — T14.8XXA MUSCLE STRAIN: ICD-10-CM

## 2021-10-24 PROCEDURE — 96361 HYDRATE IV INFUSION ADD-ON: CPT | Performed by: EMERGENCY MEDICINE

## 2021-10-24 PROCEDURE — 99284 EMERGENCY DEPT VISIT MOD MDM: CPT | Performed by: EMERGENCY MEDICINE

## 2021-10-24 PROCEDURE — 73030 X-RAY EXAM OF SHOULDER: CPT | Performed by: EMERGENCY MEDICINE

## 2021-10-24 PROCEDURE — 96374 THER/PROPH/DIAG INJ IV PUSH: CPT | Performed by: EMERGENCY MEDICINE

## 2021-10-24 PROCEDURE — 72125 CT NECK SPINE W/O DYE: CPT | Performed by: EMERGENCY MEDICINE

## 2021-10-24 PROCEDURE — 96375 TX/PRO/DX INJ NEW DRUG ADDON: CPT | Performed by: EMERGENCY MEDICINE

## 2021-10-24 PROCEDURE — 70450 CT HEAD/BRAIN W/O DYE: CPT | Performed by: EMERGENCY MEDICINE

## 2021-10-24 RX ORDER — DIPHENHYDRAMINE HYDROCHLORIDE 50 MG/ML
25 INJECTION INTRAMUSCULAR; INTRAVENOUS ONCE
Status: COMPLETED | OUTPATIENT
Start: 2021-10-24 | End: 2021-10-24

## 2021-10-24 RX ORDER — CYCLOBENZAPRINE HCL 10 MG
10 TABLET ORAL 3 TIMES DAILY PRN
Qty: 20 TABLET | Refills: 0 | Status: SHIPPED | OUTPATIENT
Start: 2021-10-24 | End: 2021-10-31

## 2021-10-24 RX ORDER — CYCLOBENZAPRINE HCL 10 MG
10 TABLET ORAL ONCE
Status: COMPLETED | OUTPATIENT
Start: 2021-10-24 | End: 2021-10-24

## 2021-10-24 RX ORDER — KETOROLAC TROMETHAMINE 30 MG/ML
30 INJECTION, SOLUTION INTRAMUSCULAR; INTRAVENOUS ONCE
Status: COMPLETED | OUTPATIENT
Start: 2021-10-24 | End: 2021-10-24

## 2021-10-24 RX ORDER — METOCLOPRAMIDE HYDROCHLORIDE 5 MG/ML
10 INJECTION INTRAMUSCULAR; INTRAVENOUS ONCE
Status: COMPLETED | OUTPATIENT
Start: 2021-10-24 | End: 2021-10-24

## 2021-10-24 RX ORDER — DICLOFENAC SODIUM 75 MG/1
75 TABLET, DELAYED RELEASE ORAL 2 TIMES DAILY
Qty: 20 TABLET | Refills: 0 | Status: SHIPPED | OUTPATIENT
Start: 2021-10-24

## 2021-10-25 NOTE — ED PROVIDER NOTES
Patient Seen in: THE [facility] Emergency Department In [place]      History   Patient presents with:  Trauma  Headache  Nausea/Vomiting/Diarrhea    Stated Complaint: fell outside and hit the back of his head, no loc.   pt with pelvic pain and hea*    Subjecti POSSIBLE BIOPSY, POSSIBLE POLYPECTOMY [id number];  Surgeon: Reggie Goodell, MD;  Location: [address] [place] [address]  10/12    normal   • ORCHIECTOMY       • OTHER SURGICAL HISTORY      neuroma   • OTHER SURGICAL HISTORY      left knee Current:/72   Pulse 66   Temp 98.2 °F (36.8 °C) (Temporal)   Resp 20   Ht 162.6 cm (5' 4\")   Wt 77.1 kg   SpO2 96%   BMI 29.18 kg/m²         Physical Exam  Vitals and nursing note reviewed.    Constitutional:       General: He is not in acute d STATED HISTORY: (As transcribed by Technologist)  Patient slipped     and fell on wet grass. He hit back of head. Posterior headaches with     nausea          FINDINGS:    No evidence of hemorrhage or extra-axial fluid collection.          Supra and infrate spinal canal or neural foraminal     stenosis. C3-C4:  Left paracentral posterior osteophyte without spinal canal or     neural foraminal stenosis. C4-C5: No disc herniation, spinal canal or neuroforaminal stenosis.     C5-C6: No disc herniation, spin 10/24/21 1943)   diphenhydrAMINE (BENADRYL) IV PUSH injection 25 mg (25 mg Intravenous Given 10/24/21 1943)   ketorolac tromethamine (TORADOL) 30 MG/ML injection 30 mg (30 mg Intravenous Given 10/24/21 1942)   sodium chloride 0.9% IV bolus 1,000 mL (0 mL I

## 2021-10-25 NOTE — ED INITIAL ASSESSMENT (HPI)
Reports slipping and falling today about 1500 while outside walking his dog. Pt states his chronic pelvic pain is flairing up and he is having a cluster headache.   Reports nausea

## 2021-12-04 ENCOUNTER — HOSPITAL ENCOUNTER (EMERGENCY)
Age: 51
Discharge: HOME | End: 2021-12-05
Attending: EMERGENCY MEDICINE
Payer: COMMERCIAL

## 2021-12-04 DIAGNOSIS — G44.019 EPISODIC CLUSTER HEADACHE, NOT INTRACTABLE: Primary | ICD-10-CM

## 2021-12-04 PROCEDURE — 96375 TX/PRO/DX INJ NEW DRUG ADDON: CPT | Performed by: EMERGENCY MEDICINE

## 2021-12-04 PROCEDURE — 99284 EMERGENCY DEPT VISIT MOD MDM: CPT | Performed by: EMERGENCY MEDICINE

## 2021-12-04 PROCEDURE — 96374 THER/PROPH/DIAG INJ IV PUSH: CPT | Performed by: EMERGENCY MEDICINE

## 2021-12-04 RX ORDER — KETOROLAC TROMETHAMINE 15 MG/ML
15 INJECTION, SOLUTION INTRAMUSCULAR; INTRAVENOUS ONCE
Status: COMPLETED | OUTPATIENT
Start: 2021-12-04 | End: 2021-12-05

## 2021-12-04 RX ORDER — DIPHENHYDRAMINE HYDROCHLORIDE 50 MG/ML
50 INJECTION INTRAMUSCULAR; INTRAVENOUS ONCE
Status: COMPLETED | OUTPATIENT
Start: 2021-12-04 | End: 2021-12-05

## 2021-12-04 RX ORDER — DEXAMETHASONE SODIUM PHOSPHATE 4 MG/ML
10 VIAL (ML) INJECTION ONCE
Status: COMPLETED | OUTPATIENT
Start: 2021-12-04 | End: 2021-12-05

## 2021-12-04 RX ORDER — METOCLOPRAMIDE HYDROCHLORIDE 5 MG/ML
10 INJECTION INTRAMUSCULAR; INTRAVENOUS ONCE
Status: COMPLETED | OUTPATIENT
Start: 2021-12-04 | End: 2021-12-05

## 2021-12-05 VITALS
DIASTOLIC BLOOD PRESSURE: 82 MMHG | SYSTOLIC BLOOD PRESSURE: 126 MMHG | RESPIRATION RATE: 16 BRPM | WEIGHT: 182.13 LBS | HEART RATE: 56 BPM | TEMPERATURE: 99 F | BODY MASS INDEX: 32.27 KG/M2 | OXYGEN SATURATION: 98 % | HEIGHT: 63 IN

## 2021-12-05 NOTE — ED INITIAL ASSESSMENT (HPI)
Pt with h/o cluster headaches present with headache worse since 2000. Pt was inpatient for 9 days for same issue, has been home for 1 week.

## 2021-12-05 NOTE — ED PROVIDER NOTES
Patient Seen in: THE [facility] Emergency Department In [place]      History   Patient presents with:  Headache    Stated Complaint: Pt, who has a hx of cluster headaches, presents to ER with a headache onset tod*    Subjective:   HPI    24-year-old male come knee x 2   • OTHER SURGICAL HISTORY      S/P Right scrotal exploration with revision of right vasectomy and excision of prominent right appendix testicle 4/18/17   • OTHER SURGICAL HISTORY  2015    S/ P Radical left orchiectomy 2015    • OTHER SURGICAL HIS bilaterally  Abdomen: Soft nontender nondistended normal active bowel sounds without rebound, guarding or masses noted  Back nontender without CVA tenderness  Extremity no clubbing, cyanosis or edema noted.   Full range of motion noted without tenderness  N diagnosis)     Disposition:  Discharge  12/5/2021  1:45 am    Follow-up:  Tyler Shah, [address] [place] [id number] [phone]    Schedule an appointment as soon as possible for a visit in 2 days            Medic

## 2022-03-10 PROBLEM — L03.90 CELLULITIS: Status: RESOLVED | Noted: 2019-05-21 | Resolved: 2022-03-10

## 2022-03-10 PROBLEM — G44.011 INTRACTABLE EPISODIC CLUSTER HEADACHE: Status: ACTIVE | Noted: 2022-03-10

## 2022-03-10 PROBLEM — E44.0 MODERATE PROTEIN-CALORIE MALNUTRITION (HCC): Status: RESOLVED | Noted: 2020-08-26 | Resolved: 2022-03-10

## 2022-03-10 PROBLEM — R53.83 FATIGUE: Status: RESOLVED | Noted: 2017-07-27 | Resolved: 2022-03-10

## 2022-03-10 PROBLEM — G43.711 CHRONIC MIGRAINE WITHOUT AURA, INTRACTABLE, WITH STATUS MIGRAINOSUS: Status: ACTIVE | Noted: 2022-02-21

## 2022-03-10 PROBLEM — M25.512 ACUTE SHOULDER PAIN DUE TO TRAUMA, LEFT: Status: RESOLVED | Noted: 2021-01-13 | Resolved: 2022-03-10

## 2022-03-10 PROBLEM — R19.7 DIARRHEA: Status: RESOLVED | Noted: 2021-04-17 | Resolved: 2022-03-10

## 2022-03-10 PROBLEM — G44.011 INTRACTABLE EPISODIC CLUSTER HEADACHE: Status: RESOLVED | Noted: 2022-03-10 | Resolved: 2022-03-10

## 2022-03-10 PROBLEM — L03.116 CELLULITIS OF LEFT LOWER EXTREMITY: Status: RESOLVED | Noted: 2019-04-26 | Resolved: 2022-03-10

## 2022-03-10 PROBLEM — M75.51 BURSITIS OF RIGHT SHOULDER: Status: RESOLVED | Noted: 2018-06-27 | Resolved: 2022-03-10

## 2022-03-10 PROBLEM — R10.9 FLANK PAIN: Status: RESOLVED | Noted: 2019-08-21 | Resolved: 2022-03-10

## 2022-03-10 PROBLEM — R51.9 INTRACTABLE HEADACHE: Status: RESOLVED | Noted: 2020-12-05 | Resolved: 2022-03-10

## 2022-03-10 PROBLEM — G89.11 ACUTE SHOULDER PAIN DUE TO TRAUMA, LEFT: Status: RESOLVED | Noted: 2021-01-13 | Resolved: 2022-03-10

## 2022-06-08 ENCOUNTER — APPOINTMENT (OUTPATIENT)
Dept: GENERAL RADIOLOGY | Age: 52
End: 2022-06-08
Attending: EMERGENCY MEDICINE
Payer: COMMERCIAL

## 2022-06-08 ENCOUNTER — HOSPITAL ENCOUNTER (EMERGENCY)
Age: 52
Discharge: HOME | End: 2022-06-08
Attending: EMERGENCY MEDICINE
Payer: COMMERCIAL

## 2022-06-08 VITALS
DIASTOLIC BLOOD PRESSURE: 80 MMHG | SYSTOLIC BLOOD PRESSURE: 118 MMHG | RESPIRATION RATE: 16 BRPM | WEIGHT: 175 LBS | HEART RATE: 61 BPM | OXYGEN SATURATION: 98 % | HEIGHT: 63 IN | BODY MASS INDEX: 31.01 KG/M2 | TEMPERATURE: 98 F

## 2022-06-08 DIAGNOSIS — T17.908A ASPIRATION INTO AIRWAY, INITIAL ENCOUNTER: Primary | ICD-10-CM

## 2022-06-08 PROCEDURE — 99283 EMERGENCY DEPT VISIT LOW MDM: CPT

## 2022-06-08 PROCEDURE — 71046 X-RAY EXAM CHEST 2 VIEWS: CPT | Performed by: EMERGENCY MEDICINE

## 2022-06-08 NOTE — ED INITIAL ASSESSMENT (HPI)
Pt states last night while eating cauliflower started coughing and felt something \" go down the wrong way\" states he had a coughing fit - pt states this morning woke up with a feeling of \"gerd\" and started coughing up phlegm -

## 2023-03-07 NOTE — PROGRESS NOTES
Impression: Drusen (degenerative) of macula, bilateral: H35.363. Plan: Discussed condition with patient. Discussed treatment options with patient. Use of vitamins has shown to improve the effects of ARMD. Recommend patient take OTC AREDS II Formula. Will continue to monitor. Patient was exposed by son to Covid 6 days ago. Denies symptoms. Discussed [facility] testing results are 5-7 days. Patient is having procedures this week at [facility] and [facility]. Advised to just do pre procedure testing there. Patient agrees, no further questions.

## 2023-12-05 ENCOUNTER — HOSPITAL ENCOUNTER (EMERGENCY)
Age: 53
Discharge: HOME | End: 2023-12-05
Attending: EMERGENCY MEDICINE
Payer: COMMERCIAL

## 2023-12-05 VITALS
BODY MASS INDEX: 26.58 KG/M2 | HEIGHT: 63 IN | RESPIRATION RATE: 18 BRPM | OXYGEN SATURATION: 98 % | DIASTOLIC BLOOD PRESSURE: 63 MMHG | SYSTOLIC BLOOD PRESSURE: 91 MMHG | HEART RATE: 57 BPM | WEIGHT: 150 LBS | TEMPERATURE: 98 F

## 2023-12-05 DIAGNOSIS — G43.809 OTHER MIGRAINE WITHOUT STATUS MIGRAINOSUS, NOT INTRACTABLE: Primary | ICD-10-CM

## 2023-12-05 LAB
ANION GAP SERPL CALC-SCNC: 4 MMOL/L (ref 0–18)
BUN BLD-MCNC: 17 MG/DL (ref 9–23)
CALCIUM BLD-MCNC: 7.9 MG/DL (ref 8.5–10.1)
CHLORIDE SERPL-SCNC: 104 MMOL/L (ref 98–112)
CO2 SERPL-SCNC: 30 MMOL/L (ref 21–32)
CREAT BLD-MCNC: 0.91 MG/DL
EGFRCR SERPLBLD CKD-EPI 2021: 100 ML/MIN/1.73M2 (ref 60–?)
GLUCOSE BLD-MCNC: 117 MG/DL (ref 70–99)
OSMOLALITY SERPL CALC.SUM OF ELEC: 289 MOSM/KG (ref 275–295)
POTASSIUM SERPL-SCNC: 3.6 MMOL/L (ref 3.5–5.1)
SODIUM SERPL-SCNC: 138 MMOL/L (ref 136–145)

## 2023-12-05 PROCEDURE — 96374 THER/PROPH/DIAG INJ IV PUSH: CPT

## 2023-12-05 PROCEDURE — 96361 HYDRATE IV INFUSION ADD-ON: CPT

## 2023-12-05 PROCEDURE — 80048 BASIC METABOLIC PNL TOTAL CA: CPT | Performed by: EMERGENCY MEDICINE

## 2023-12-05 PROCEDURE — 99284 EMERGENCY DEPT VISIT MOD MDM: CPT

## 2023-12-05 PROCEDURE — 96375 TX/PRO/DX INJ NEW DRUG ADDON: CPT

## 2023-12-05 RX ORDER — DIPHENHYDRAMINE HYDROCHLORIDE 50 MG/ML
25 INJECTION INTRAMUSCULAR; INTRAVENOUS ONCE
Status: COMPLETED | OUTPATIENT
Start: 2023-12-05 | End: 2023-12-05

## 2023-12-05 RX ORDER — KETOROLAC TROMETHAMINE 15 MG/ML
15 INJECTION, SOLUTION INTRAMUSCULAR; INTRAVENOUS ONCE
Status: COMPLETED | OUTPATIENT
Start: 2023-12-05 | End: 2023-12-05

## 2023-12-05 RX ORDER — METOCLOPRAMIDE 5 MG/1
5 TABLET ORAL EVERY 6 HOURS PRN
Qty: 5 TABLET | Refills: 0 | Status: SHIPPED | OUTPATIENT
Start: 2023-12-05 | End: 2023-12-10

## 2023-12-05 RX ORDER — METOCLOPRAMIDE HYDROCHLORIDE 5 MG/ML
10 INJECTION INTRAMUSCULAR; INTRAVENOUS ONCE
Status: COMPLETED | OUTPATIENT
Start: 2023-12-05 | End: 2023-12-05

## 2023-12-06 NOTE — ED INITIAL ASSESSMENT (HPI)
Patient recent admission due to cluster headache and migraine. Released from [facility] on 11/25. Still having headache. Tried all medications that he can but no relief.

## 2024-04-03 NOTE — ED NOTES
Pt c/o rectal pain and sts that he normally takes rectal valium for this and wants to know if there is anything he can get for his pelvic floor disorder. 2.18

## 2024-05-10 ENCOUNTER — HOSPITAL ENCOUNTER (EMERGENCY)
Facility: HOSPITAL | Age: 54
Discharge: HOME | End: 2024-05-10
Attending: EMERGENCY MEDICINE

## 2024-05-10 ENCOUNTER — APPOINTMENT (OUTPATIENT)
Dept: CT IMAGING | Facility: HOSPITAL | Age: 54
End: 2024-05-10

## 2024-05-10 VITALS
OXYGEN SATURATION: 98 % | BODY MASS INDEX: 29.23 KG/M2 | HEART RATE: 51 BPM | DIASTOLIC BLOOD PRESSURE: 66 MMHG | TEMPERATURE: 99 F | HEIGHT: 63 IN | SYSTOLIC BLOOD PRESSURE: 119 MMHG | WEIGHT: 165 LBS | RESPIRATION RATE: 16 BRPM
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
## 2024-05-10 DIAGNOSIS — R51.9 ACUTE NONINTRACTABLE HEADACHE, UNSPECIFIED HEADACHE TYPE: ICD-10-CM

## 2024-05-10 DIAGNOSIS — S16.1XXA STRAIN OF NECK MUSCLE, INITIAL ENCOUNTER: ICD-10-CM

## 2024-05-10 PROCEDURE — 70450 CT HEAD/BRAIN W/O DYE: CPT

## 2024-05-10 PROCEDURE — 96374 THER/PROPH/DIAG INJ IV PUSH: CPT

## 2024-05-10 PROCEDURE — 99284 EMERGENCY DEPT VISIT MOD MDM: CPT

## 2024-05-10 PROCEDURE — 99285 EMERGENCY DEPT VISIT HI MDM: CPT

## 2024-05-10 PROCEDURE — 72125 CT NECK SPINE W/O DYE: CPT

## 2024-05-10 RX ORDER — ONDANSETRON 2 MG/ML
INJECTION INTRAMUSCULAR; INTRAVENOUS
Status: DISCONTINUED
Start: 2024-05-10 | End: 2024-05-10

## 2024-05-10 RX ORDER — DIAZEPAM 5 MG/1
5 TABLET ORAL 3 TIMES DAILY PRN
Qty: 20 TABLET | Refills: 0 | Status: SHIPPED | OUTPATIENT
Start: 2024-05-10 | End: 2024-05-17

## 2024-05-10 RX ORDER — DIAZEPAM 5 MG/1
5 TABLET ORAL ONCE
Status: COMPLETED | OUTPATIENT
Start: 2024-05-10 | End: 2024-05-10

## 2024-05-10 RX ORDER — ONDANSETRON 2 MG/ML
4 INJECTION INTRAMUSCULAR; INTRAVENOUS ONCE
Status: COMPLETED | OUTPATIENT
Start: 2024-05-10 | End: 2024-05-10
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
## 2024-05-11 NOTE — DISCHARGE INSTRUCTIONS
Acetaminophen 1000 mg every 6 hours for pain.    Valium may cause drowsiness.  Please do not take it if you need to work or drive.    Return to the emergency department for new or worsening symptoms.
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
## 2024-05-20 ENCOUNTER — HOSPITAL ENCOUNTER (EMERGENCY)
Age: 54
Discharge: HOME | End: 2024-05-20
Attending: EMERGENCY MEDICINE

## 2024-05-20 ENCOUNTER — APPOINTMENT (OUTPATIENT)
Dept: CT IMAGING | Age: 54
End: 2024-05-20
Attending: EMERGENCY MEDICINE

## 2024-05-20 VITALS
WEIGHT: 160 LBS | BODY MASS INDEX: 28.35 KG/M2 | HEART RATE: 66 BPM | SYSTOLIC BLOOD PRESSURE: 107 MMHG | DIASTOLIC BLOOD PRESSURE: 72 MMHG | RESPIRATION RATE: 16 BRPM | TEMPERATURE: 98 F | HEIGHT: 63 IN | OXYGEN SATURATION: 98 %

## 2024-05-20 DIAGNOSIS — S09.90XD INJURY OF HEAD, SUBSEQUENT ENCOUNTER: Primary | ICD-10-CM

## 2024-05-20 PROCEDURE — 99284 EMERGENCY DEPT VISIT MOD MDM: CPT

## 2024-05-20 PROCEDURE — 70450 CT HEAD/BRAIN W/O DYE: CPT | Performed by: EMERGENCY MEDICINE
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
## 2024-05-20 NOTE — DISCHARGE INSTRUCTIONS
Follow-up for further evaluation with your orthopedic specialist as discussed.  Follow-up with neurology as discussed.  Rest, plenty of fluids.  Tylenol or ibuprofen as needed.  Return if new or worse symptoms.
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
## 2024-06-14 PROCEDURE — 93010 ELECTROCARDIOGRAM REPORT: CPT | Performed by: INTERNAL MEDICINE

## 2024-10-13 ENCOUNTER — APPOINTMENT (OUTPATIENT)
Dept: CT IMAGING | Age: 54
End: 2024-10-13
Attending: EMERGENCY MEDICINE
Payer: COMMERCIAL

## 2024-10-13 ENCOUNTER — HOSPITAL ENCOUNTER (EMERGENCY)
Age: 54
Discharge: HOME | End: 2024-10-13
Attending: EMERGENCY MEDICINE
Payer: COMMERCIAL

## 2024-10-13 VITALS
BODY MASS INDEX: 26.22 KG/M2 | HEART RATE: 56 BPM | HEIGHT: 63 IN | DIASTOLIC BLOOD PRESSURE: 77 MMHG | TEMPERATURE: 98 F | OXYGEN SATURATION: 98 % | RESPIRATION RATE: 14 BRPM | WEIGHT: 148 LBS | SYSTOLIC BLOOD PRESSURE: 109 MMHG

## 2024-10-13 DIAGNOSIS — R10.9 RIGHT FLANK PAIN: Primary | ICD-10-CM

## 2024-10-13 LAB
ALBUMIN SERPL-MCNC: 3.6 G/DL (ref 3.4–5)
ALBUMIN/GLOB SERPL: 1.2 {RATIO} (ref 1–2)
ALP LIVER SERPL-CCNC: 106 U/L
ALT SERPL-CCNC: 42 U/L
ANION GAP SERPL CALC-SCNC: 2 MMOL/L (ref 0–18)
AST SERPL-CCNC: 22 U/L (ref 15–37)
BASOPHILS # BLD AUTO: 0.04 X10(3) UL (ref 0–0.2)
BASOPHILS NFR BLD AUTO: 0.7 %
BILIRUB SERPL-MCNC: 0.2 MG/DL (ref 0.1–2)
BILIRUB UR QL STRIP.AUTO: NEGATIVE
BUN BLD-MCNC: 18 MG/DL (ref 9–23)
CALCIUM BLD-MCNC: 9.3 MG/DL (ref 8.5–10.1)
CHLORIDE SERPL-SCNC: 104 MMOL/L (ref 98–112)
CLARITY UR REFRACT.AUTO: CLEAR
CO2 SERPL-SCNC: 30 MMOL/L (ref 21–32)
COLOR UR AUTO: YELLOW
CREAT BLD-MCNC: 1.04 MG/DL
EGFRCR SERPLBLD CKD-EPI 2021: 85 ML/MIN/1.73M2 (ref 60–?)
EOSINOPHIL # BLD AUTO: 0.12 X10(3) UL (ref 0–0.7)
EOSINOPHIL NFR BLD AUTO: 2.2 %
ERYTHROCYTE [DISTWIDTH] IN BLOOD BY AUTOMATED COUNT: 14.6 %
GLOBULIN PLAS-MCNC: 2.9 G/DL (ref 2.8–4.4)
GLUCOSE BLD-MCNC: 83 MG/DL (ref 70–99)
GLUCOSE UR STRIP.AUTO-MCNC: >=1000 MG/DL
HCT VFR BLD AUTO: 44.3 %
HGB BLD-MCNC: 14.7 G/DL
IMM GRANULOCYTES # BLD AUTO: 0.01 X10(3) UL (ref 0–1)
IMM GRANULOCYTES NFR BLD: 0.2 %
KETONES UR STRIP.AUTO-MCNC: NEGATIVE MG/DL
LEUKOCYTE ESTERASE UR QL STRIP.AUTO: NEGATIVE
LIPASE SERPL-CCNC: 47 U/L (ref 12–53)
LYMPHOCYTES # BLD AUTO: 1.35 X10(3) UL (ref 1–4)
LYMPHOCYTES NFR BLD AUTO: 25.2 %
MCH RBC QN AUTO: 26.7 PG (ref 26–34)
MCHC RBC AUTO-ENTMCNC: 33.2 G/DL (ref 31–37)
MCV RBC AUTO: 80.4 FL
MONOCYTES # BLD AUTO: 0.58 X10(3) UL (ref 0.1–1)
MONOCYTES NFR BLD AUTO: 10.8 %
NEUTROPHILS # BLD AUTO: 3.25 X10 (3) UL (ref 1.5–7.7)
NEUTROPHILS # BLD AUTO: 3.25 X10(3) UL (ref 1.5–7.7)
NEUTROPHILS NFR BLD AUTO: 60.9 %
NITRITE UR QL STRIP.AUTO: NEGATIVE
OSMOLALITY SERPL CALC.SUM OF ELEC: 283 MOSM/KG (ref 275–295)
PH UR STRIP.AUTO: 6.5 [PH] (ref 5–8)
PLATELET # BLD AUTO: 170 10(3)UL (ref 150–450)
POTASSIUM SERPL-SCNC: 3.9 MMOL/L (ref 3.5–5.1)
PROT SERPL-MCNC: 6.5 G/DL (ref 6.4–8.2)
PROT UR STRIP.AUTO-MCNC: NEGATIVE MG/DL
RBC # BLD AUTO: 5.51 X10(6)UL
RBC UR QL AUTO: NEGATIVE
SODIUM SERPL-SCNC: 136 MMOL/L (ref 136–145)
SP GR UR STRIP.AUTO: 1.01 (ref 1–1.03)
UROBILINOGEN UR STRIP.AUTO-MCNC: 0.2 MG/DL
WBC # BLD AUTO: 5.4 X10(3) UL (ref 4–11)

## 2024-10-13 PROCEDURE — 99284 EMERGENCY DEPT VISIT MOD MDM: CPT

## 2024-10-13 PROCEDURE — 81003 URINALYSIS AUTO W/O SCOPE: CPT | Performed by: EMERGENCY MEDICINE

## 2024-10-13 PROCEDURE — 99285 EMERGENCY DEPT VISIT HI MDM: CPT

## 2024-10-13 PROCEDURE — 83690 ASSAY OF LIPASE: CPT | Performed by: EMERGENCY MEDICINE

## 2024-10-13 PROCEDURE — 74176 CT ABD & PELVIS W/O CONTRAST: CPT | Performed by: EMERGENCY MEDICINE

## 2024-10-13 PROCEDURE — 51798 US URINE CAPACITY MEASURE: CPT

## 2024-10-13 PROCEDURE — 96374 THER/PROPH/DIAG INJ IV PUSH: CPT

## 2024-10-13 PROCEDURE — 85025 COMPLETE CBC W/AUTO DIFF WBC: CPT | Performed by: EMERGENCY MEDICINE

## 2024-10-13 PROCEDURE — 80053 COMPREHEN METABOLIC PANEL: CPT | Performed by: EMERGENCY MEDICINE

## 2024-10-13 RX ORDER — KETOROLAC TROMETHAMINE 15 MG/ML
15 INJECTION, SOLUTION INTRAMUSCULAR; INTRAVENOUS ONCE
Status: COMPLETED | OUTPATIENT
Start: 2024-10-13 | End: 2024-10-13

## 2024-10-13 NOTE — ED INITIAL ASSESSMENT (HPI)
Patient here with right flank pain and pain with urination since Monday. States urinary frequency and doesn't feel like he is emptying bladder. Denies fever. Denies blood in urine.

## 2024-10-13 NOTE — ED PROVIDER NOTES
Patient Seen in: [facility] In [place]      History     Chief Complaint   Patient presents with    Abdomen/Flank Pain     Stated Complaint: r side flank pain    Subjective:   HPI      54-year-old male with a past medical history as below including fibromyalgia, GERD, anxiety/depression, IBS, BPH presents with pain in his left flank that started about 6 days ago.  Patient states his dog was trying snuggle with him and hit him in the lower lateral rib cage when he noted the pain then.  He states that he has chronic pelvic pain syndrome and does have some increased pain in his left groin area on health not that he thought might have been a lymph node.  He does report some dysuria but denies hematuria.  He also feels like he cannot fully empty his bladder at times.  He denies nausea, vomiting or diarrhea.  Denies fevers.  Denies penile discharge.    Objective:     Past Medical History:    ANXIETY    Anxiety state    Back pain    L5 and S1 disc deteriation    Depression    Esophageal reflux    Fibromyalgia    Headache disorder    Cluster H/A    High blood pressure    High cholesterol    Hx of diseases NEC    h/o pleurisy    Hx of diseases NEC    h/o chronic pelvic pain    Hypogonadism in male    Irritable bowel syndrome    Other and unspecified hyperlipidemia    Pneumonia due to organism    Typhoid fever    Unspecified sleep apnea    DME update:  [employer] CPAP 11CWP              Past Surgical History:   Procedure Laterality Date    Colonoscopy  10/12    normal; repeat 10 yrs    Colonoscopy,biopsy  10/16/2012    Procedure: ESOPHAGOGASTRODUODENOSCOPY, COLONOSCOPY, POSSIBLE BIOPSY, POSSIBLE POLYPECTOMY 70532,89853;  Surgeon: Woody Cardona MD;  [place] [facility] [place] [facility]  10/12    normal    Orchiectomy        Other surgical history      neuroma    Other surgical history      left knee x 2    Other surgical history      S/P Right scrotal exploration with revision of right  vasectomy and excision of prominent right appendix testicle 4/18/17    Other surgical history  2015    S/ P Radical left orchiectomy 2015     Other surgical history  06/28/2017    Radical R orchiectomy 6/28/17    Repair ing hernia,5+y/o,reducibl      s/p  ventral hernia repair    Skin surgery  12/22/2016    Exc - Left Lower Back r/o Lipoma    Skin surgery  12/22/2016    Exc- Left Upper Paraspinal Back r/o sebaceous cyst     Special service or report      h/o knee surgery x2    Upper gi endoscopy,diagnosis  10/16/2012    Procedure: ESOPHAGOGASTRODUODENOSCOPY, COLONOSCOPY, POSSIBLE BIOPSY, POSSIBLE POLYPECTOMY 47145,20338;  Surgeon: Woody Cardona MD;  [place] [facility] [place] [facility] [place]    Vasectomy  09/22/2008                Social History     Socioeconomic History    Marital status:     Number of children: 3   Occupational History    Occupation: school employee   Tobacco Use    Smoking status: Never    Smokeless tobacco: Never   Vaping Use    Vaping status: Never Used   Substance and Sexual Activity    Alcohol use: Not Currently     Comment: last use April 21, 2019, attends AA    Drug use: Not Currently     Types: Cannabis     Comment: edibles, does not use currently    Sexual activity: Yes     Partners: Female   Other Topics Concern    Caffeine Concern Yes    Exercise No    Seat Belt Yes     Social Drivers of Health      [facility], [facility]    Social Connections    Received from [facility], [facility]    Housing Stability                  Physical Exam     ED Triage Vitals [10/13/24 1608]   /77   Pulse 60   Resp 18   Temp 98 °F (36.7 °C)   Temp src Temporal   SpO2 99 %   O2 Device None (Room air)       Current Vitals:   Vital Signs  BP: 109/77  Pulse: 56  Resp: 14  Temp: 98 °F (36.7 °C)  Temp src: Temporal    Oxygen Therapy  SpO2: 98 %  O2 Device: None (Room air)        Physical Exam  Vitals and  nursing note reviewed.   Constitutional:       General: He is not in acute distress.     Appearance: He is well-developed. He is not ill-appearing.   HENT:      Head: Normocephalic and atraumatic.      Mouth/Throat:      Mouth: Mucous membranes are moist.   Eyes:      General: No scleral icterus.     Extraocular Movements: Extraocular movements intact.   Cardiovascular:      Rate and Rhythm: Normal rate and regular rhythm.   Pulmonary:      Effort: Pulmonary effort is normal.      Breath sounds: Normal breath sounds.   Abdominal:      General: Bowel sounds are normal. There is no distension.      Palpations: Abdomen is soft.      Tenderness: There is abdominal tenderness. There is right CVA tenderness. There is no guarding or rebound.      Comments: Tenderness on the lower lateral costal margin extending to the right CVA area  Mild diffuse right-sided abdominal tenderness along with suprapubic tenderness   Musculoskeletal:      Cervical back: Neck supple.   Skin:     General: Skin is warm and dry.      Capillary Refill: Capillary refill takes less than 2 seconds.   Neurological:      Mental Status: He is alert and oriented to person, place, and time.      GCS: GCS eye subscore is 4. GCS verbal subscore is 5. GCS motor subscore is 6.   Psychiatric:         Mood and Affect: Mood normal.         Behavior: Behavior normal.             ED Course     Labs Reviewed   URINALYSIS WITH CULTURE REFLEX - Abnormal; Notable for the following components:       Result Value    Glucose Urine >=1000 (*)     All other components within normal limits   COMP METABOLIC PANEL (14) - Normal   LIPASE - Normal   CBC WITH DIFFERENTIAL WITH PLATELET          CT ABDOMEN+PELVIS KIDNEYSTONE 2D RNDR(NO IV,NO ORAL)(CPT=74176)    Result Date: 10/13/2024  CONCLUSION:  No hydronephrosis, or hydroureter.  Calcification right pelvis potentially could be stone distal right ureter based on this location, differential includes phlebolith. The urinary  bladder is not well distended.  The wall appears thickened, which could be a consequence of the under distention, and can also be seen with cystitis.  Advise correlation with any potential clinical signs or symptoms of cystitis, and correlation with urinalysis.     LOCATION:  [facility]   Dictated by (CST): Sp Armstrong MD on 10/13/2024 at 6:03 PM     Finalized by (CST): Sp Armstrong MD on 10/13/2024 at 6:05 PM               MDM      54-year-old male with a past medical history as below including fibromyalgia, GERD, anxiety/depression, IBS, BPH presents with pain in his left flank that started about 6 days ago.    Differential includes but is not limited to ureteral calculus, UTI/pyelonephritis, musculoskeletal pain, less likely cholecystitis or appendicitis    Labs are unremarkable normal WBC, hemoglobin, electrolytes and renal function.  LFTs and lipase are normal.  UA shows some glucose but no evidence of UTI or any hematuria.    Independent trepidation of CT abdomen/pelvis shows no evidence of obstructing stones, appendicitis or cholecystitis.  Radiology report reviewed as above noting no hydro and nephrosis or hydroureter.  Report notes a calcification in the right pelvis potentially could be in the distal right ureter but differential includes phlebolith.  Urinary bladder is not well-distended appears thicker likely due to under distention as patient has no findings concerning for UTI on labs.    Patient's left flank pain is likely musculoskeletal pain worsening with movement.  Patient  was given Toradol with improvement in symptoms.  He states he does not like to take pain medication because he has a history of cluster headaches and he gets rebound headaches if he takes analgesics.  Will DC home with Rx for Zanaflex.  Instructed follow-up with PCP for further outpatient management.  Return precaution discussed.      Medical Decision Making  Amount and/or Complexity of Data Reviewed  Labs: ordered.  Decision-making details documented in ED Course.  Radiology: ordered and independent interpretation performed. Decision-making details documented in ED Course.    Risk  Prescription drug management.        Disposition and Plan     Clinical Impression:  1. Right flank pain         Disposition:  Discharge  10/13/2024  6:52 pm    Follow-up:  Kalia Parker MD  [address] [facility]  [address] [place] [id number] [phone]    Schedule an appointment as soon as possible for a visit in 3 day(s)            Medications Prescribed:  Discharge Medication List as of 10/13/2024  6:56 PM        START taking these medications    Details   tiZANidine 4 MG Oral Tab Take 1 tablet (4 mg total) by mouth every 8 (eight) hours as needed., Normal, Disp-20 tablet, R-0                 Supplementary Documentation:

## 2025-02-22 ENCOUNTER — APPOINTMENT (OUTPATIENT)
Dept: CT IMAGING | Facility: HOSPITAL | Age: 55
End: 2025-02-22
Attending: EMERGENCY MEDICINE
Payer: COMMERCIAL

## 2025-02-22 ENCOUNTER — HOSPITAL ENCOUNTER (OUTPATIENT)
Facility: HOSPITAL | Age: 55
Setting detail: OBSERVATION
Discharge: HOME | End: 2025-02-23
Attending: EMERGENCY MEDICINE
Payer: COMMERCIAL

## 2025-02-22 DIAGNOSIS — G45.9 TRANSIENT ISCHEMIC ATTACK (TIA): Primary | ICD-10-CM

## 2025-02-22 LAB
ALBUMIN SERPL-MCNC: 4.7 G/DL (ref 3.2–4.8)
ALBUMIN/GLOB SERPL: 2.2 {RATIO} (ref 1–2)
ALP LIVER SERPL-CCNC: 63 U/L
ALT SERPL-CCNC: 33 U/L
AMPHET UR QL SCN: NEGATIVE
ANION GAP SERPL CALC-SCNC: 7 MMOL/L (ref 0–18)
APTT PPP: 31.6 SECONDS (ref 23–36)
AST SERPL-CCNC: 33 U/L (ref ?–34)
BARBITURATES UR QL SCN: NEGATIVE
BASOPHILS # BLD AUTO: 0.03 X10(3) UL (ref 0–0.2)
BASOPHILS NFR BLD AUTO: 0.6 %
BENZODIAZ UR QL SCN: NEGATIVE
BILIRUB SERPL-MCNC: 0.4 MG/DL (ref 0.3–1.2)
BILIRUB UR QL STRIP.AUTO: NEGATIVE
BUN BLD-MCNC: 17 MG/DL (ref 9–23)
CALCIUM BLD-MCNC: 9 MG/DL (ref 8.7–10.6)
CANNABINOIDS UR QL SCN: NEGATIVE
CHLORIDE SERPL-SCNC: 104 MMOL/L (ref 98–112)
CHOLEST SERPL-MCNC: 169 MG/DL (ref ?–200)
CLARITY UR REFRACT.AUTO: CLEAR
CO2 SERPL-SCNC: 30 MMOL/L (ref 21–32)
COCAINE UR QL: NEGATIVE
CREAT BLD-MCNC: 1.11 MG/DL
CREAT UR-SCNC: 120.4 MG/DL
EGFRCR SERPLBLD CKD-EPI 2021: 78 ML/MIN/1.73M2 (ref 60–?)
EOSINOPHIL # BLD AUTO: 0.12 X10(3) UL (ref 0–0.7)
EOSINOPHIL NFR BLD AUTO: 2.3 %
ERYTHROCYTE [DISTWIDTH] IN BLOOD BY AUTOMATED COUNT: 14.1 %
FENTANYL UR QL SCN: NEGATIVE
GLOBULIN PLAS-MCNC: 2.1 G/DL (ref 2–3.5)
GLUCOSE BLD-MCNC: 102 MG/DL (ref 70–99)
GLUCOSE BLD-MCNC: 108 MG/DL (ref 70–99)
GLUCOSE UR STRIP.AUTO-MCNC: >1000 MG/DL
HCT VFR BLD AUTO: 46.7 %
HDLC SERPL-MCNC: 57 MG/DL (ref 40–59)
HGB BLD-MCNC: 15.5 G/DL
IMM GRANULOCYTES # BLD AUTO: 0.02 X10(3) UL (ref 0–1)
IMM GRANULOCYTES NFR BLD: 0.4 %
INR BLD: 1.05 (ref 0.8–1.2)
KETONES UR STRIP.AUTO-MCNC: NEGATIVE MG/DL
LDLC SERPL CALC-MCNC: 96 MG/DL (ref ?–100)
LEUKOCYTE ESTERASE UR QL STRIP.AUTO: NEGATIVE
LYMPHOCYTES # BLD AUTO: 1.11 X10(3) UL (ref 1–4)
LYMPHOCYTES NFR BLD AUTO: 21.2 %
MCH RBC QN AUTO: 27.8 PG (ref 26–34)
MCHC RBC AUTO-ENTMCNC: 33.2 G/DL (ref 31–37)
MCV RBC AUTO: 83.8 FL
METHADONE UR QL SCN: NEGATIVE
MONOCYTES # BLD AUTO: 0.64 X10(3) UL (ref 0.1–1)
MONOCYTES NFR BLD AUTO: 12.2 %
NEUTROPHILS # BLD AUTO: 3.31 X10 (3) UL (ref 1.5–7.7)
NEUTROPHILS # BLD AUTO: 3.31 X10(3) UL (ref 1.5–7.7)
NEUTROPHILS NFR BLD AUTO: 63.3 %
NITRITE UR QL STRIP.AUTO: NEGATIVE
NONHDLC SERPL-MCNC: 112 MG/DL (ref ?–130)
OPIATES UR QL SCN: NEGATIVE
OSMOLALITY SERPL CALC.SUM OF ELEC: 294 MOSM/KG (ref 275–295)
OXYCODONE UR QL SCN: NEGATIVE
PCP UR QL SCN: NEGATIVE
PH UR STRIP.AUTO: 5.5 [PH] (ref 5–8)
PLATELET # BLD AUTO: 183 10(3)UL (ref 150–450)
POTASSIUM SERPL-SCNC: 3.9 MMOL/L (ref 3.5–5.1)
PROT SERPL-MCNC: 6.8 G/DL (ref 5.7–8.2)
PROT UR STRIP.AUTO-MCNC: NEGATIVE MG/DL
PROTHROMBIN TIME: 13.8 SECONDS (ref 11.6–14.8)
RBC # BLD AUTO: 5.57 X10(6)UL
RBC UR QL AUTO: NEGATIVE
SODIUM SERPL-SCNC: 141 MMOL/L (ref 136–145)
SP GR UR STRIP.AUTO: 1.03 (ref 1–1.03)
TRIGL SERPL-MCNC: 88 MG/DL (ref 30–149)
TROPONIN I SERPL HS-MCNC: <3 NG/L
TSI SER-ACNC: 1.88 UIU/ML (ref 0.55–4.78)
UROBILINOGEN UR STRIP.AUTO-MCNC: NORMAL MG/DL
VLDLC SERPL CALC-MCNC: 14 MG/DL (ref 0–30)
WBC # BLD AUTO: 5.2 X10(3) UL (ref 4–11)

## 2025-02-22 PROCEDURE — 70450 CT HEAD/BRAIN W/O DYE: CPT | Performed by: EMERGENCY MEDICINE

## 2025-02-22 RX ORDER — SODIUM PHOSPHATE, DIBASIC AND SODIUM PHOSPHATE, MONOBASIC 7; 19 G/230ML; G/230ML
1 ENEMA RECTAL ONCE AS NEEDED
Status: DISCONTINUED | OUTPATIENT
Start: 2025-02-22 | End: 2025-02-23

## 2025-02-22 RX ORDER — ACETAMINOPHEN 500 MG
500 TABLET ORAL EVERY 4 HOURS PRN
Status: DISCONTINUED | OUTPATIENT
Start: 2025-02-22 | End: 2025-02-23

## 2025-02-22 RX ORDER — ONDANSETRON 2 MG/ML
4 INJECTION INTRAMUSCULAR; INTRAVENOUS EVERY 6 HOURS PRN
Status: DISCONTINUED | OUTPATIENT
Start: 2025-02-22 | End: 2025-02-23

## 2025-02-22 RX ORDER — BISACODYL 10 MG
10 SUPPOSITORY, RECTAL RECTAL
Status: DISCONTINUED | OUTPATIENT
Start: 2025-02-22 | End: 2025-02-23

## 2025-02-22 RX ORDER — PROCHLORPERAZINE EDISYLATE 5 MG/ML
5 INJECTION INTRAMUSCULAR; INTRAVENOUS EVERY 8 HOURS PRN
Status: DISCONTINUED | OUTPATIENT
Start: 2025-02-22 | End: 2025-02-23

## 2025-02-22 RX ORDER — BACLOFEN 10 MG/1
10 TABLET ORAL NIGHTLY
Status: DISCONTINUED | OUTPATIENT
Start: 2025-02-22 | End: 2025-02-23

## 2025-02-22 RX ORDER — SODIUM CHLORIDE 9 MG/ML
INJECTION, SOLUTION INTRAVENOUS CONTINUOUS
Status: ACTIVE | OUTPATIENT
Start: 2025-02-22 | End: 2025-02-23

## 2025-02-22 RX ORDER — CARBAMAZEPINE 200 MG/1
300 TABLET ORAL 2 TIMES DAILY
Status: DISCONTINUED | OUTPATIENT
Start: 2025-02-22 | End: 2025-02-23

## 2025-02-22 RX ORDER — SODIUM CHLORIDE 9 MG/ML
INJECTION, SOLUTION INTRAVENOUS CONTINUOUS
Status: DISCONTINUED | OUTPATIENT
Start: 2025-02-22 | End: 2025-02-23

## 2025-02-22 RX ORDER — LABETALOL HYDROCHLORIDE 5 MG/ML
10 INJECTION, SOLUTION INTRAVENOUS EVERY 10 MIN PRN
Status: DISCONTINUED | OUTPATIENT
Start: 2025-02-22 | End: 2025-02-23

## 2025-02-22 RX ORDER — ASPIRIN 325 MG
325 TABLET ORAL ONCE
Status: COMPLETED | OUTPATIENT
Start: 2025-02-22 | End: 2025-02-22

## 2025-02-22 RX ORDER — ACETAMINOPHEN 650 MG/1
650 SUPPOSITORY RECTAL EVERY 4 HOURS PRN
Status: DISCONTINUED | OUTPATIENT
Start: 2025-02-22 | End: 2025-02-23

## 2025-02-22 RX ORDER — ASPIRIN 81 MG/1
81 TABLET, CHEWABLE ORAL DAILY
Status: DISCONTINUED | OUTPATIENT
Start: 2025-02-23 | End: 2025-02-23

## 2025-02-22 RX ORDER — HYDRALAZINE HYDROCHLORIDE 20 MG/ML
10 INJECTION INTRAMUSCULAR; INTRAVENOUS EVERY 2 HOUR PRN
Status: DISCONTINUED | OUTPATIENT
Start: 2025-02-22 | End: 2025-02-23

## 2025-02-22 RX ORDER — LIDOCAINE HYDROCHLORIDE 20 MG/ML
10 SOLUTION OROPHARYNGEAL ONCE
Status: COMPLETED | OUTPATIENT
Start: 2025-02-22 | End: 2025-02-22

## 2025-02-22 RX ORDER — ACETAMINOPHEN 325 MG/1
650 TABLET ORAL EVERY 4 HOURS PRN
Status: DISCONTINUED | OUTPATIENT
Start: 2025-02-22 | End: 2025-02-23

## 2025-02-22 RX ORDER — TAMSULOSIN HYDROCHLORIDE 0.4 MG/1
0.4 CAPSULE ORAL
Status: DISCONTINUED | OUTPATIENT
Start: 2025-02-23 | End: 2025-02-23

## 2025-02-22 RX ORDER — ALPRAZOLAM 0.25 MG
0.25 TABLET ORAL 3 TIMES DAILY
Status: DISCONTINUED | OUTPATIENT
Start: 2025-02-22 | End: 2025-02-23

## 2025-02-22 RX ORDER — POLYETHYLENE GLYCOL 3350 17 G/17G
17 POWDER, FOR SOLUTION ORAL DAILY PRN
Status: DISCONTINUED | OUTPATIENT
Start: 2025-02-22 | End: 2025-02-23

## 2025-02-22 RX ORDER — PANTOPRAZOLE SODIUM 40 MG/1
40 TABLET, DELAYED RELEASE ORAL
Status: DISCONTINUED | OUTPATIENT
Start: 2025-02-22 | End: 2025-02-23

## 2025-02-22 RX ORDER — SENNOSIDES 8.6 MG
17.2 TABLET ORAL NIGHTLY PRN
Status: DISCONTINUED | OUTPATIENT
Start: 2025-02-22 | End: 2025-02-23

## 2025-02-22 NOTE — ED PROVIDER NOTES
Patient Seen in: [facility] Emergency Department      History     Chief Complaint   Patient presents with    Stroke     Stated Complaint: pt thinks he ma have had a stroke, pt  states that at the beging of the week he*    Subjective:   HPI      55-year-old man with complaints of word-finding difficulty that started yesterday around 8:00 AM while at work.  He patient states that he was reading interview questions and was having a hard time getting the words out, and says he knew what to say but could not say it.  People mention the speech changes yesterday with some intermittent slurred speech.  Patient states speech problems resolved today.  He is having brain fog, generalized malaise, dry mouth, dysphagia so he came in for evaluation.  Denies any extremity weakness or paresthesias.    Objective:     Past Medical History:    ANXIETY    Anxiety state    Back pain    L5 and S1 disc deteriation    Depression    Esophageal reflux    Fibromyalgia    Headache disorder    Cluster H/A    High blood pressure    High cholesterol    Hx of diseases NEC    h/o pleurisy    Hx of diseases NEC    h/o chronic pelvic pain    Hypogonadism in male    Irritable bowel syndrome    Other and unspecified hyperlipidemia    Pneumonia due to organism    Typhoid fever    Unspecified sleep apnea    DME update:  Premier CPAP 11CWP              Past Surgical History:   Procedure Laterality Date    Colonoscopy  10/12    normal; repeat 10 yrs    Colonoscopy,biopsy  10/16/2012    Procedure: ESOPHAGOGASTRODUODENOSCOPY, COLONOSCOPY, POSSIBLE BIOPSY, POSSIBLE POLYPECTOMY 24758,90503;  Surgeon: Woody Cardona MD;  Location: [facility] [place] [facility] [place]  10/12    normal    Orchiectomy        Other surgical history      neuroma    Other surgical history      left knee x 2    Other surgical history      S/P Right scrotal exploration with revision of right vasectomy and excision of prominent right appendix testicle 4/18/17    Other  surgical history  2015    S/ P Radical left orchiectomy 2015     Other surgical history  06/28/2017    Radical R orchiectomy 6/28/17    Repair ing hernia,5+y/o,reducibl      s/p  ventral hernia repair    Skin surgery  12/22/2016    Exc - Left Lower Back r/o Lipoma    Skin surgery  12/22/2016    Exc- Left Upper Paraspinal Back r/o sebaceous cyst     Special service or report      h/o knee surgery x2    Upper gi endoscopy,diagnosis  10/16/2012    Procedure: ESOPHAGOGASTRODUODENOSCOPY, COLONOSCOPY, POSSIBLE BIOPSY, POSSIBLE POLYPECTOMY 74228,64836;  Surgeon: Woody Cardona MD;  Location: [facility] [place] [facility], [facility]    Vasectomy  09/22/2008                Social History     Socioeconomic History    Marital status:     Number of children: 3   Occupational History    Occupation: school employee   Tobacco Use    Smoking status: Never    Smokeless tobacco: Never   Vaping Use    Vaping status: Never Used   Substance and Sexual Activity    Alcohol use: Not Currently     Comment: last use April 21, 2019, attends AA    Drug use: Not Currently     Types: Cannabis     Comment: edibles, does not use currently    Sexual activity: Yes     Partners: Female   Other Topics Concern    Caffeine Concern Yes    Exercise No    Seat Belt Yes     Social Drivers of Health      [facility]    Housing Stability                  Physical Exam     ED Triage Vitals   BP 02/22/25 1537 113/76   Pulse 02/22/25 1537 77   Resp 02/22/25 1537 20   Temp 02/22/25 1756 98.2 °F (36.8 °C)   Temp src 02/22/25 1756 Oral   SpO2 02/22/25 1537 100 %   O2 Device 02/22/25 1537 None (Room air)       Current Vitals:   Vital Signs  BP: 109/75  Pulse: 102  Resp: 11  Temp: 98.2 °F (36.8 °C)  Temp src: Oral  MAP (mmHg): 86    Oxygen Therapy  SpO2: 93 %  O2 Device: None (Room air)        Physical Exam  Vitals and nursing note reviewed.   Constitutional:       General: He is not in acute distress.      Appearance: He is well-developed.   HENT:      Head: Normocephalic and atraumatic.   Eyes:      Extraocular Movements: Extraocular movements intact.      Pupils: Pupils are equal, round, and reactive to light.   Cardiovascular:      Rate and Rhythm: Normal rate and regular rhythm.      Pulses: Normal pulses.      Heart sounds: Normal heart sounds. No murmur heard.     No gallop.   Pulmonary:      Effort: Pulmonary effort is normal. No respiratory distress.      Breath sounds: Normal breath sounds.   Abdominal:      General: Abdomen is flat.      Palpations: Abdomen is soft.      Tenderness: There is no abdominal tenderness.   Musculoskeletal:      Cervical back: Neck supple.   Skin:     General: Skin is warm and dry.      Capillary Refill: Capillary refill takes less than 2 seconds.   Neurological:      General: No focal deficit present.      Mental Status: He is alert and oriented to person, place, and time.      Comments: Alert and oriented x 4.  Normal motor and sensation in bilateral upper and lower extremities.  Normal speech.   Psychiatric:         Mood and Affect: Mood is anxious.         Behavior: Behavior normal.             ED Course     Labs Reviewed   COMP METABOLIC PANEL (14) - Abnormal; Notable for the following components:       Result Value    Glucose 108 (*)     A/G Ratio 2.2 (*)     All other components within normal limits   URINALYSIS WITH CULTURE REFLEX - Abnormal; Notable for the following components:    Glucose Urine >1000 (*)     All other components within normal limits   POCT GLUCOSE - Abnormal; Notable for the following components:    POC Glucose 102 (*)     All other components within normal limits   PROTHROMBIN TIME (PT) - Normal   PTT, ACTIVATED - Normal   TROPONIN I HIGH SENSITIVITY - Normal   CBC WITH DIFFERENTIAL WITH PLATELET   DRUG ABUSE PANEL 11 SCREEN   RAINBOW DRAW LAVENDER   RAINBOW DRAW LIGHT GREEN   RAINBOW DRAW BLUE   RAINBOW DRAW GOLD     EKG INTERPRETATION  Time: 18:09    sinus bradycardia, ventricular rate is 51  Normal axis  Normal MS, QRS, and QTc intervals  No chamber enlargement  Normal ST-T segments  EKG is unchanged compared to prior EKG completed on #  I, the emergency physician, independently interpreted this EKG in the absence of a cardiologist.    ED Course as of 02/22/25 1811  ------------------------------------------------------------  Time: 02/22 1710  Comment: Independently interpreted labs, unremarkable.       CT BRAIN OR HEAD (CPT=70450)    Result Date: 2/22/2025  CONCLUSION:  1. No acute intracranial hemorrhage. 2. If an acute infarct is of high clinical concern, recommend MRI for further evaluation.    LOCATION:  [facility]   Dictated by (CST): Carol Castle MD on 2/22/2025 at 4:12 PM     Finalized by (CST): Carol Castle MD on 2/22/2025 at 4:14 PM             MDM          Admission disposition: 2/22/2025  5:40 PM           Medical Decision Making  Patient with word finding difficulty yesterday and self-reported dysarthria, the symptoms have resolved today, only complaining of dysphagia  Lower extremity weakness or sensory changes, exam is nonfocal  CT head is negative  Differential diagnosis includes TIA versus anxiety versus multiple sclerosis versus psychosomatization        Amount and/or Complexity of Data Reviewed  Labs: ordered. Decision-making details documented in ED Course.  Radiology: ordered and independent interpretation performed. Decision-making details documented in ED Course.    Risk  Prescription drug management.  Decision regarding hospitalization.  Diagnosis or treatment significantly limited by social determinants of health.        Disposition and Plan     Clinical Impression:  1. Transient ischemic attack (TIA)         Disposition:  Admit  2/22/2025  5:40 pm    Follow-up:  No follow-up provider specified.        Medications Prescribed:  Current Discharge Medication List              Supplementary Documentation:         Hospital Problems       Present on  Admission  Date Reviewed: 3/8/2023   None

## 2025-02-22 NOTE — ED INITIAL ASSESSMENT (HPI)
Insomnia over the last week, with cotton mouth. Was having difficult finding works yesterday morning at work, also difficulty swelling since last nigth as it is \"painful\" now with L sidfe facaial droop;

## 2025-02-23 ENCOUNTER — APPOINTMENT (OUTPATIENT)
Dept: CV DIAGNOSTICS | Facility: HOSPITAL | Age: 55
End: 2025-02-23
Attending: STUDENT IN AN ORGANIZED HEALTH CARE EDUCATION/TRAINING PROGRAM
Payer: COMMERCIAL

## 2025-02-23 ENCOUNTER — APPOINTMENT (OUTPATIENT)
Dept: CT IMAGING | Facility: HOSPITAL | Age: 55
End: 2025-02-23
Attending: Other
Payer: COMMERCIAL

## 2025-02-23 ENCOUNTER — APPOINTMENT (OUTPATIENT)
Dept: MRI IMAGING | Facility: HOSPITAL | Age: 55
End: 2025-02-23
Attending: STUDENT IN AN ORGANIZED HEALTH CARE EDUCATION/TRAINING PROGRAM
Payer: COMMERCIAL

## 2025-02-23 VITALS
HEART RATE: 59 BPM | BODY MASS INDEX: 25.37 KG/M2 | DIASTOLIC BLOOD PRESSURE: 78 MMHG | HEIGHT: 63 IN | SYSTOLIC BLOOD PRESSURE: 117 MMHG | WEIGHT: 143.19 LBS | OXYGEN SATURATION: 98 % | TEMPERATURE: 98 F | RESPIRATION RATE: 16 BRPM

## 2025-02-23 PROBLEM — G43.019 INTRACTABLE MIGRAINE WITHOUT AURA AND WITHOUT STATUS MIGRAINOSUS: Status: ACTIVE | Noted: 2022-02-21

## 2025-02-23 PROBLEM — G50.0 TRIGEMINAL NEURALGIA OF LEFT SIDE OF FACE: Status: ACTIVE | Noted: 2020-07-29

## 2025-02-23 LAB
ANION GAP SERPL CALC-SCNC: 3 MMOL/L (ref 0–18)
ATRIAL RATE: 51 BPM
BASOPHILS # BLD AUTO: 0.03 X10(3) UL (ref 0–0.2)
BASOPHILS NFR BLD AUTO: 0.6 %
BUN BLD-MCNC: 15 MG/DL (ref 9–23)
CALCIUM BLD-MCNC: 8.6 MG/DL (ref 8.7–10.6)
CHLORIDE SERPL-SCNC: 104 MMOL/L (ref 98–112)
CO2 SERPL-SCNC: 31 MMOL/L (ref 21–32)
CREAT BLD-MCNC: 0.98 MG/DL
EGFRCR SERPLBLD CKD-EPI 2021: 91 ML/MIN/1.73M2 (ref 60–?)
EOSINOPHIL # BLD AUTO: 0.12 X10(3) UL (ref 0–0.7)
EOSINOPHIL NFR BLD AUTO: 2.5 %
ERYTHROCYTE [DISTWIDTH] IN BLOOD BY AUTOMATED COUNT: 14.2 %
EST. AVERAGE GLUCOSE BLD GHB EST-MCNC: 105 MG/DL (ref 68–126)
GLUCOSE BLD-MCNC: 79 MG/DL (ref 70–99)
GLUCOSE BLD-MCNC: 84 MG/DL (ref 70–99)
GLUCOSE BLD-MCNC: 87 MG/DL (ref 70–99)
HBA1C MFR BLD: 5.3 % (ref ?–5.7)
HCT VFR BLD AUTO: 42.6 %
HGB BLD-MCNC: 14.3 G/DL
IMM GRANULOCYTES # BLD AUTO: 0.02 X10(3) UL (ref 0–1)
IMM GRANULOCYTES NFR BLD: 0.4 %
LYMPHOCYTES # BLD AUTO: 1 X10(3) UL (ref 1–4)
LYMPHOCYTES NFR BLD AUTO: 20.7 %
MCH RBC QN AUTO: 27.8 PG (ref 26–34)
MCHC RBC AUTO-ENTMCNC: 33.6 G/DL (ref 31–37)
MCV RBC AUTO: 82.7 FL
MONOCYTES # BLD AUTO: 0.56 X10(3) UL (ref 0.1–1)
MONOCYTES NFR BLD AUTO: 11.6 %
NEUTROPHILS # BLD AUTO: 3.11 X10 (3) UL (ref 1.5–7.7)
NEUTROPHILS # BLD AUTO: 3.11 X10(3) UL (ref 1.5–7.7)
NEUTROPHILS NFR BLD AUTO: 64.2 %
OSMOLALITY SERPL CALC.SUM OF ELEC: 286 MOSM/KG (ref 275–295)
P AXIS: 25 DEGREES
P-R INTERVAL: 168 MS
PLATELET # BLD AUTO: 145 10(3)UL (ref 150–450)
POTASSIUM SERPL-SCNC: 4.2 MMOL/L (ref 3.5–5.1)
Q-T INTERVAL: 418 MS
QRS DURATION: 90 MS
QTC CALCULATION (BEZET): 385 MS
R AXIS: -2 DEGREES
RBC # BLD AUTO: 5.15 X10(6)UL
SODIUM SERPL-SCNC: 138 MMOL/L (ref 136–145)
T AXIS: 33 DEGREES
VENTRICULAR RATE: 51 BPM
WBC # BLD AUTO: 4.8 X10(3) UL (ref 4–11)

## 2025-02-23 PROCEDURE — 70551 MRI BRAIN STEM W/O DYE: CPT | Performed by: STUDENT IN AN ORGANIZED HEALTH CARE EDUCATION/TRAINING PROGRAM

## 2025-02-23 PROCEDURE — 93306 TTE W/DOPPLER COMPLETE: CPT | Performed by: STUDENT IN AN ORGANIZED HEALTH CARE EDUCATION/TRAINING PROGRAM

## 2025-02-23 PROCEDURE — 70498 CT ANGIOGRAPHY NECK: CPT | Performed by: OTHER

## 2025-02-23 PROCEDURE — 70496 CT ANGIOGRAPHY HEAD: CPT | Performed by: OTHER

## 2025-02-23 PROCEDURE — 99223 1ST HOSP IP/OBS HIGH 75: CPT | Performed by: OTHER

## 2025-02-23 RX ORDER — MIRTAZAPINE 7.5 MG/1
7.5 TABLET, FILM COATED ORAL NIGHTLY
Status: DISCONTINUED | OUTPATIENT
Start: 2025-02-23 | End: 2025-02-23

## 2025-02-23 NOTE — H&P
[facility] Hospitalist H&P       CC:   Chief Complaint   Patient presents with    Stroke        PCP: Kalia Parker MD    History of Present Illness: Patient is a 55 year old male with PMH of anxiety, back pain, depression, GERD, fibromyalgia, hypertension, hyperlipidemia, DICK who is presenting to the hospital due to word finding difficulty.  Patient notes that symptoms started around 8 AM yesterday when he was at work, says that he was reading interview questions and was having a hard time getting words out.  He says that he knew what he had to say but was just not able to say.  People around him mentioned that he was slurring his speech.  Symptoms did resolve.  He does note also having a right-sided facial droop which has been bothering him since yesterday.    Stable vitals on arrival, CBC and CMP unremarkable.  Troponin normal.  Urinalysis unremarkable.  CT head negative.  Patient admitted to the hospital for further evaluation of TIA with neurology on consult.    PMH  Past Medical History:    ANXIETY    Anxiety state    Back pain    L5 and S1 disc deteriation    Depression    Esophageal reflux    Fibromyalgia    Headache disorder    Cluster H/A    High blood pressure    High cholesterol    Hx of diseases NEC    h/o pleurisy    Hx of diseases NEC    h/o chronic pelvic pain    Hypogonadism in male    Irritable bowel syndrome    Other and unspecified hyperlipidemia    Pneumonia due to organism    Typhoid fever    Unspecified sleep apnea    DME update:  Premier CPAP 11CWP        PSH  Past Surgical History:   Procedure Laterality Date    Colonoscopy  10/12    normal; repeat 10 yrs    Colonoscopy,biopsy  10/16/2012    Procedure: ESOPHAGOGASTRODUODENOSCOPY, COLONOSCOPY, POSSIBLE BIOPSY, POSSIBLE POLYPECTOMY 65512,93322;  Surgeon: Woody Cardona MD;  [place] [facility] [place] [facility] [place] [place]  10/12    normal    Orchiectomy        Other surgical history      neuroma    Other surgical  history      left knee x 2    Other surgical history      S/P Right scrotal exploration with revision of right vasectomy and excision of prominent right appendix testicle 4/18/17    Other surgical history  2015    S/ P Radical left orchiectomy 2015     Other surgical history  06/28/2017    Radical R orchiectomy 6/28/17    Repair ing hernia,5+y/o,reducibl      s/p  ventral hernia repair    Skin surgery  12/22/2016    Exc - Left Lower Back r/o Lipoma    Skin surgery  12/22/2016    Exc- Left Upper Paraspinal Back r/o sebaceous cyst     Special service or report      h/o knee surgery x2    Upper gi endoscopy,diagnosis  10/16/2012    Procedure: ESOPHAGOGASTRODUODENOSCOPY, COLONOSCOPY, POSSIBLE BIOPSY, POSSIBLE POLYPECTOMY 82618,87000;  Surgeon: Woody Cardona MD;  Location: [facility] [place] [facility], [facility]    Vasectomy  09/22/2008        ALL:  Allergies[1]     Home Medications:  Medications Taking[2]      Soc Hx  Social History     Tobacco Use    Smoking status: Never    Smokeless tobacco: Never   Substance Use Topics    Alcohol use: Not Currently     Comment: last use April 21, 2019, attends [employer] Hx  Family History   Problem Relation Age of Onset    Diabetes Mother     Heart Disorder Mother     Other (Other) Mother         srojgrens    Diabetes Maternal Grandmother     Heart Disorder Maternal Grandmother     Heart Disorder Other         early CAD in uncles    Other (Other) Other         Sjogren's, RA    Heart Disorder Maternal Grandfather     Diabetes Paternal Grandmother     Heart Disorder Paternal Grandmother     Diabetes Paternal Grandfather     Heart Disorder Paternal Grandfather        Review of Systems  Comprehensive ROS reviewed and negative except for what's stated above.     OBJECTIVE:  /73 (BP Location: Left arm)   Pulse 57   Temp 98 °F (36.7 °C) (Oral)   Resp 16   Ht 5' 3\" (1.6 m)   Wt 143 lb 3.2 oz (65 kg)   SpO2 97%   BMI 25.37 kg/m²     Gen: Alert, no acute distress  Heent:  Normocephalic, atraumatic, neck supple, EOMI, PERRLA  Pulm: Lungs CTAB, normal respiratory effort  CV:  Regular rate and rhythm, no murmurs/rubs/gallops  Abd: Soft, nontender, nondistended, bowel sounds present  Extremities: No peripheral edema, no clubbing, pulses intact   Skin: No rashes or lesions  Neuro: AOx3, mild right-sided facial droop, normal strength in all 4 extremities.  Sensation intact bilaterally.  Psych: appropriate mood and affect    Diagnostic Data:    CBC/Chem  Recent Labs   Lab 02/22/25  1547 02/23/25  0704   WBC 5.2 4.8   HGB 15.5 14.3   MCV 83.8 82.7   .0 145.0*   INR 1.05  --        Recent Labs   Lab 02/22/25  1547 02/23/25  0704    138   K 3.9 4.2    104   CO2 30.0 31.0   BUN 17 15   CREATSERUM 1.11 0.98   * 87   CA 9.0 8.6*       Recent Labs   Lab 02/22/25  1547   ALT 33   AST 33   ALB 4.7       No results for input(s): \"TROP\" in the last 168 hours.    Additional Diagnostics: ECG: normal sinus rhythm, no blocks or conduction defects, no ischemic changes      Radiology: MRI BRAIN (CPT=70551)    Result Date: 2/23/2025  CONCLUSION:  No acute intracranial pathology.   LOCATION:  [facility]   Dictated by (CST): Wisam Hamlin MD on 2/23/2025 at 8:22 AM     Finalized by (CST): Wisam Hamlin MD on 2/23/2025 at 8:26 AM       CT BRAIN OR HEAD (CPT=70450)    Result Date: 2/22/2025  CONCLUSION:  1. No acute intracranial hemorrhage. 2. If an acute infarct is of high clinical concern, recommend MRI for further evaluation.    LOCATION:  [facility]   Dictated by (CST): Carol Castle MD on 2/22/2025 at 4:12 PM     Finalized by (CST): Carol Castle MD on 2/22/2025 at 4:14 PM          ASSESSMENT / PLAN:     55 year old male with PMH of anxiety, back pain, depression, GERD, fibromyalgia, hypertension, hyperlipidemia, DICK, trigeminal neuralgia, who is presenting to the hospital due to word finding difficulty.    Word finding difficulty  TIA?  -Symptoms resolved, differentials include TIA versus  anxiety versus other metabolic/pharmacologic causes, possibly related to trigeminal neuralgia versus complex migraines given headaches.  -Aspirin 81 mg daily started  -Neurology consulted  -MRI brain unremarkable  -CTA head/neck ordered per neurology  -Echocardiogram pending  -Hemoglobin A1c reviewed, 5.3.  Lipid panel appears unremarkable.    Depression, anxiety  -Continue home Xanax, mirtazapine    Trigeminal neuralgia  Fibromyalgia  -Continue home baclofen, carbamazepine    Migraines  -Continue home Nurtec    BPH  -Continue home tamsulosin    Dispo: Inpatient    Outpatient or previous hospital records reviewed. Questions/concerns were discussed with patient and/or family by bedside.   A total of 76 minutes were taken with patient and coordinating care.     Philomena Fernandez [facility]  Contact via [employer]/Notice Kiosk                                  Advanced Care Planning    While discussing goals of care with the patient and their family, patient voluntarily participated in an advanced care planning discussion. The following was discussed: Patient like to be full code at this time.  He is okay with all treatments as stated above.    16 minutes were spent in discussing advanced care planning. This time was exclusive of the documented time for this visit.      Supplementary Documentation:   DVT Mechanical Prophylaxis:   SCDs,    DVT Pharmacologic Prophylaxis   Medication   None      DVT Pharmacologic prophylaxis: Aspirin 162 mg         Code Status: Full Code  Burgos: No urinary catheter in place  Burgos Duration (in days):   Central line:    CONSTANTINO:                            [1]   Allergies  Allergen Reactions    Amitriptyline MYALGIA    Effexor [Venlafaxine] PALPITATIONS, ANXIETY and OTHER (SEE COMMENTS)     nightsweats      Cymbalta [Duloxetine Hcl] INSOMNIA     impotence    Latex      Added based on information entered during case entry, please review and add reactions, type, and severity  as needed    Lipitor [Atorvastatin] MYALGIA    Metformin OTHER (SEE COMMENTS)     Bloating and gas    Pcn [Penicillins] HIVES and OTHER (SEE COMMENTS)    Pregabalin MYALGIA and OTHER (SEE COMMENTS)     Insomnia      Risperdal [Risperidone] OTHER (SEE COMMENTS)     Agitated, irritable, difficulty sleeping    Statins MYALGIA     Involuntary limb movement and impotence      Zetia [Ezetimibe] MYALGIA and OTHER (SEE COMMENTS)    Erythromycin Base DIARRHEA   [2]   Outpatient Medications Marked as Taking for the 2/22/25 encounter (Hospital Encounter)   Medication Sig Dispense Refill    mirtazapine 7.5 MG Oral Tab Take 1 tablet (7.5 mg total) by mouth nightly. 30 tablet 2    cloNIDine 0.1 MG Oral Tab 1/2 to 1 tab by mouth twice daily for anxiety 60 tablet 0    Magnesium 200 MG Oral Tab Take by mouth As Directed.      pantoprazole 40 MG Oral Tab EC Take 1 tablet (40 mg total) by mouth before breakfast. 90 tablet 3    Rimegepant Sulfate 75 MG Oral Tablet Dispersible Take 75 mg by mouth.      alfuzosin 10 MG Oral Tablet 24 Hr       Tadalafil 20 MG Oral Tab Take 1 tablet (20 mg total) by mouth daily as needed for Erectile Dysfunction. 30 tablet 2    misoprostol 200 MCG Oral Tab Take 1 tablet (200 mcg total) by mouth.      SUMATRIPTAN SUCCINATE 6 MG/0.5ML Subcutaneous Solution Auto-injector       Galcanezumab-gnlm (EMGALITY SC) Inject into the skin.      BACLOFEN 10 MG Oral Tab TAKE 1 TABLET BY MOUTH AT BEDTIME 30 tablet 5    carBAMazepine 200 MG Oral Tab Take 1.5 tablets (300 mg total) by mouth 2 (two) times daily. 45 tablet 0    Testosterone Cypionate 200 MG/ML Injection Solution Inject 200 mg into the skin every 14 (fourteen) days.      Melatonin 10 MG Oral Cap Take 10 mg by mouth nightly as needed.

## 2025-02-23 NOTE — PROGRESS NOTES
NURSING ADMISSION NOTE      Patient admitted via Cart  Oriented to room.  Safety precautions initiated.  Bed in low position.  Call light in reach.    Assumed care at 2220. A&OX4. RA. VSS. Pt endorsing difficulty swallowing, pt able to pass dysphagia screen. Per report, pt experiencing \"L sided facial droop\", upon assessment \"R sided facial droop\" noted. Navigator and MRI screening form completed by this RN at pt bedside. Wife at the bedside. Safety precautions in place, call light within reach. Will continue with plan of care.

## 2025-02-23 NOTE — OCCUPATIONAL THERAPY NOTE
OT orders received, chart reviewed and therapist talked with pt. Pt denies any changes in function, UEs, or vision. Observed to be independent bed mobility, toileting, and hand hygiene at sink. Pt reports no concerns and states he already worked with PT. Politely declines therapy. Orders completed. Please re-order if new functional limitation presents.

## 2025-02-23 NOTE — CONSULTS
[facility]   NEUROLOGY   CONSULT NOTE    Admission date: 2/22/2025  Reason for Consult: TIA/stroke.  Chief Complaint:   Chief Complaint   Patient presents with    Stroke   ________________________________________________________________    History     History of Presenting Illness  55 year old male with history of multiple medical problems including hypertension, hyperlipidemia, anxiety, history of cluster headaches as well as left trigeminal neuralgia started having increasing right frontotemporal pain with occasional word finding difficulties and facial pain.  He also felt that he was slightly disoriented and had been feeling symptoms of xerostomia and dysphagia for the last 1 week.  Patient was evaluated by emergency department and admitted by ER physician.  Currently still feels a bit slow, has right frontotemporal pain, denies any speech difficulties, weakness, numbness or paresthesias.    History obtained from patient, chart review.    Past Medical History:    ANXIETY    Anxiety state    Back pain    L5 and S1 disc deteriation    Depression    Esophageal reflux    Fibromyalgia    Headache disorder    Cluster H/A    High blood pressure    High cholesterol    Hx of diseases NEC    h/o pleurisy    Hx of diseases NEC    h/o chronic pelvic pain    Hypogonadism in male    Irritable bowel syndrome    Other and unspecified hyperlipidemia    Pneumonia due to organism    Typhoid fever    Unspecified sleep apnea    DME update:  Premier CPAP 11CWP     Past Surgical History:   Procedure Laterality Date    Colonoscopy  10/12    normal; repeat 10 yrs    Colonoscopy,biopsy  10/16/2012    Procedure: ESOPHAGOGASTRODUODENOSCOPY, COLONOSCOPY, POSSIBLE BIOPSY, POSSIBLE POLYPECTOMY 72973,89824;  Surgeon: Woody Cardona MD;  [place] [facility] [place] [facility] [place]  10[place]12    normal    Orchiectomy        Other surgical history      neuroma    Other surgical history      left knee x 2    Other  surgical history      S/P Right scrotal exploration with revision of right vasectomy and excision of prominent right appendix testicle 4/18/17    Other surgical history  2015    S/ P Radical left orchiectomy 2015     Other surgical history  06/28/2017    Radical R orchiectomy 6/28/17    Repair ing hernia,5+y/o,reducibl      s/p  ventral hernia repair    Skin surgery  12/22/2016    Exc - Left Lower Back r/o Lipoma    Skin surgery  12/22/2016    Exc- Left Upper Paraspinal Back r/o sebaceous cyst     Special service or report      h/o knee surgery x2    Upper gi endoscopy,diagnosis  10/16/2012    Procedure: ESOPHAGOGASTRODUODENOSCOPY, COLONOSCOPY, POSSIBLE BIOPSY, POSSIBLE POLYPECTOMY 92410,21942;  Surgeon: Woody Cardona MD;  [place] [facility]    Vasectomy  09/22/2008     Social History     Socioeconomic History    Marital status:     Number of children: 3   Occupational History    Occupation: school employee   Tobacco Use    Smoking status: Never    Smokeless tobacco: Never   Vaping Use    Vaping status: Never Used   Substance and Sexual Activity    Alcohol use: Not Currently     Comment: last use April 21, 2019, attends     Drug use: Not Currently     Types: Cannabis     Comment: edibles, does not use currently    Sexual activity: Yes     Partners: Female   Other Topics Concern    Caffeine Concern Yes    Exercise No    Seat Belt Yes     Social Drivers of Health     Food Insecurity: No Food Insecurity (2/22/2025)    NCSS - Food Insecurity     Worried About Running Out of Food in the Last Year: No     Ran Out of Food in the Last Year: No   Transportation Needs: No Transportation Needs (2/22/2025)    NCSS - Transportation     Lack of Transportation: No   Housing Stability: Not At Risk (2/22/2025)    NCSS - Housing/Utilities     Has Housing: Yes     Worried About Losing Housing: No     Unable to Get Utilities: No     Family History   Problem Relation Age of Onset    Diabetes Mother      Heart Disorder Mother     Other (Other) Mother         srojgrens    Diabetes Maternal Grandmother     Heart Disorder Maternal Grandmother     Heart Disorder Other         early CAD in uncles    Other (Other) Other         Sjogren's, RA    Heart Disorder Maternal Grandfather     Diabetes Paternal Grandmother     Heart Disorder Paternal Grandmother     Diabetes Paternal Grandfather     Heart Disorder Paternal Grandfather      Allergies Allergies[1]    Home Meds  Current Outpatient Medications   Medication Instructions    alfuzosin 10 MG Oral Tablet 24 Hr No dose, route, or frequency recorded.    ALPRAZolam ER (XANAX XR) 1 mg, Oral, Daily as needed    BACLOFEN 10 MG Oral Tab TAKE 1 TABLET BY MOUTH AT BEDTIME    carBAMazepine (TEGRETOL) 300 mg, Oral, 2 times daily    clindamycin 300 MG Oral Cap TAKE 2 CAPLETS BY MOUTH STAT AND 1 THREE TIMES DAILY UNTIL FINISHED    cloNIDine 0.1 MG Oral Tab 1/2 to 1 tab by mouth twice daily for anxiety    ergocalciferol 1.25 MG (81547 UT) Oral Cap One po weekly    Galcanezumab-gnlm (EMGALITY SC) Inject into the skin.    ibuprofen (MOTRIN) 600 mg, As needed    Magnesium 200 MG Oral Tab As Directed    Melatonin 10 mg, Nightly PRN    methylphenidate 10 MG Oral Tab 1 tab at am and noon    Minocycline HCl 100 MG Oral Tab One po every day    mirtazapine (REMERON) 7.5 mg, Oral, Nightly    miSOPROStol (CYTOTEC) 200 mcg    pantoprazole (PROTONIX) 40 mg, Oral, Before breakfast    predniSONE 20 MG Oral Tab No dose, route, or frequency recorded.    Rimegepant Sulfate 75 mg    SUMATRIPTAN SUCCINATE 6 MG/0.5ML Subcutaneous Solution Auto-injector No dose, route, or frequency recorded.    Tadalafil 20 mg, Oral, Daily as needed    Testosterone Cypionate 200 MG/ML Injection Solution 1 mL, Every 14 days    tiZANidine (ZANAFLEX) 4 mg, Oral, Every 8 hours PRN    TRIAMCINOLONE 0.1 % External Cream APPLY TOPICALLY TO ECZEMA ON TRUNK TWICE DAILY FOR UP TO 14 DAYS AS NEEDED    ubrogepant (UBRELVY) 50 MG Oral Tab  1 as needed for migraine, repeat one in 2 hrs Up to 4x/day     Scheduled Meds:   mirtazapine  7.5 mg Oral Nightly    [Held by provider] Rimegepant Sulfate  75 mg Oral Daily    aspirin  81 mg Oral Daily    ALPRAZolam  0.25 mg Oral TID    baclofen  10 mg Oral Nightly    carBAMazepine  300 mg Oral BID    pantoprazole  40 mg Oral Before breakfast    tamsulosin  0.4 mg Oral Daily @ 0700     Continuous Infusions:   sodium chloride 75 mL/hr at 02/23/25 0900     PRN Meds:  acetaminophen    polyethylene glycol (PEG 3350)    sennosides    bisacodyl    fleet enema    ondansetron    prochlorperazine    acetaminophen **OR** acetaminophen    labetalol    hydrALAzine    ondansetron    OBJECTIVE   VITAL SIGNS:   Temp:  [97.6 °F (36.4 °C)-98.2 °F (36.8 °C)] 98.2 °F (36.8 °C)  Pulse:  [] 63  Resp:  [11-20] 16  BP: ()/(61-77) 104/74  SpO2:  [93 %-100 %] 97 %    PHYSICAL EXAM:    NEUROLOGIC:    Mental Status:  A&O x 4, Follows simple commands, no obvious aphasia or dysarthria  Cranial nerves: PERRL.  Visual fields full.  EOMI.  Face symmetric with normal movement bilaterally.  Hearing grossly intact. Tongue midline with normal movements.   Motor: Drift:  Absent; Motor exam is 5 out of 5 in all extremities bilaterally  Sensation: Intact to light touch bilaterally  Cerebellar: Normal Finger-To-Nose test      LABORATORY DATA:  Last 24 hour labs were reviewed in detail.  Recent Labs   Lab 02/22/25  1547 02/23/25  0704    138   K 3.9 4.2    104   CO2 30.0 31.0   * 87   BUN 17 15   CREATSERUM 1.11 0.98     Recent Labs   Lab 02/22/25  1547 02/23/25  0704   WBC 5.2 4.8   HGB 15.5 14.3   .0 145.0*     Recent Labs   Lab 02/22/25  1547   ALT 33   AST 33     No results for input(s): \"MG\", \"PHOS\" in the last 168 hours.  Last A1c value was 5.3% done 2/22/2025.     Lab Results   Component Value Date    LDL 96 02/22/2025    HDL 57 02/22/2025    TRIG 88 02/22/2025    VLDL 14 02/22/2025        Radiology:    MRI  BRAIN (CPT=70551)    Result Date: 2/23/2025  CONCLUSION:  No acute intracranial pathology.   LOCATION:  [facility]   Dictated by (CST): Wisam Hamlin MD on 2/23/2025 at 8:22 AM     Finalized by (CST): Wisam Hamlin MD on 2/23/2025 at 8:26 AM       CT BRAIN OR HEAD (CPT=70450)    Result Date: 2/22/2025  CONCLUSION:  1. No acute intracranial hemorrhage. 2. If an acute infarct is of high clinical concern, recommend MRI for further evaluation.    LOCATION:  [facility]   Dictated by (CST): Carol Castle MD on 2/22/2025 at 4:12 PM     Finalized by (CST): Carol Castle MD on 2/22/2025 at 4:14 PM      ASSESSMENT/PLAN   55 year old male with:    Patient with symptoms of headache, with some concerns for difficulty speaking.  Speech difficulties have resolved.  Patient still has headache.  Has history of headaches and has been diagnosed with migraine/cluster headache.  Usually uses sumatriptan, Rimegepant for pain at home.  MRI of the brain is negative.  Advise CTA of the head and neck..  History of left trigeminal neuralgia.  Patient to continue oxcarbazepine as previously.  No clear evidence to suggest stroke/TIA.  If the CTA of head and neck is negative for acute LVO/critical stenosis, no further inpatient neuro recommendations.  Patient can follow-up with his neurologist after discharge.  Patient counseled.  All questions answered.    Principal Problem:    Transient ischemic attack (TIA)       Jose Angel Ambriz MD  Neurohospitalist  [facility]    Disclaimer: This record was dictated using Dragon software. There may be errors due to voice recognition problems that were not realized and corrected during the completion of the note.           [1]   Allergies  Allergen Reactions    Amitriptyline MYALGIA    Effexor [Venlafaxine] PALPITATIONS, ANXIETY and OTHER (SEE COMMENTS)     nightsweats      Cymbalta [Duloxetine Hcl] INSOMNIA     impotence    Latex      Added based on information entered during case entry, please  review and add reactions, type, and severity as needed    Lipitor [Atorvastatin] MYALGIA    Metformin OTHER (SEE COMMENTS)     Bloating and gas    Pcn [Penicillins] HIVES and OTHER (SEE COMMENTS)    Pregabalin MYALGIA and OTHER (SEE COMMENTS)     Insomnia      Risperdal [Risperidone] OTHER (SEE COMMENTS)     Agitated, irritable, difficulty sleeping    Statins MYALGIA     Involuntary limb movement and impotence      Zetia [Ezetimibe] MYALGIA and OTHER (SEE COMMENTS)    Erythromycin Base DIARRHEA

## 2025-02-23 NOTE — PHYSICAL THERAPY NOTE
PHYSICAL THERAPY EVALUATION - INPATIENT     Room Number: 3623/3623-A  Evaluation Date: 2025  Type of Evaluation: Initial  Physician Order: PT Eval and Treat    Presenting Problem: admit with word finding difficulty     Reason for Therapy: Mobility Dysfunction and Discharge Planning    PHYSICAL THERAPY ASSESSMENT   Patient is a 55 year old male admitted 2025 for word finding difficulty.   Patient is currently functioning at baseline with bed mobility, transfers, gait, and maintaining seated position. Prior to admission, patient's baseline is IND.     Patient currently functioning at his baseline. No further IP PT needs identified. IP PT to sign off at this time.    PLAN  Patient has been evaluated and presents with no skilled Physical Therapy needs at this time.  Patient discharged from Physical Therapy services.  Please re-order if a new functional limitation presents during this admission.    PT Device Recommendation: None    GOALS  Patient was able to achieve the following goals ...    Patient was able to transfer At previous, functional level  Safely and independently   Patient able to ambulate on level surfaces At previous, functional level  Safely and independently     HOME SITUATION  Type of Home: House  Home Layout: Two level  Stairs to Enter : 2        Stairs to Bedroom: 10         Lives With: Family    Drives: Yes   Patient Regularly Uses: None     Prior Level of [place]: pt reports being IND prior to admission. No falls. Works full time. No device use.    SUBJECTIVE  \"Thank you\"    OBJECTIVE     Fall Risk: Standard fall risk    WEIGHT BEARING RESTRICTION     PAIN ASSESSMENT  Ratin          COGNITION  Overall Cognitive Status:  WFL - within functional limits    RANGE OF MOTION AND STRENGTH ASSESSMENT  Upper extremity ROM and strength are within functional limits     Lower extremity ROM is within functional limits     Lower extremity strength is within functional limits      BALANCE  Static Sitting: Normal  Dynamic Sitting: Normal  Static Standing: Normal  Dynamic Standing: Normal    ADDITIONAL TESTS  Additional Tests: Modified [place]              Modified Yisel: 0                  ACTIVITY TOLERANCE                         O2 WALK       NEUROLOGICAL FINDINGS  Neurological Findings: None                     AM-PAC '6-Clicks' INPATIENT SHORT FORM - BASIC MOBILITY  How much difficulty does the patient currently have...  Patient Difficulty: Turning over in bed (including adjusting bedclothes, sheets and blankets)?: None   Patient Difficulty: Sitting down on and standing up from a chair with arms (e.g., wheelchair, bedside commode, etc.): None   Patient Difficulty: Moving from lying on back to sitting on the side of the bed?: None   How much help from another person does the patient currently need...   Help from Another: Moving to and from a bed to a chair (including a wheelchair)?: None   Help from Another: Need to walk in hospital room?: None   Help from Another: Climbing 3-5 steps with a railing?: None       AM-PAC Score:  Raw Score: 24   Approx Degree of Impairment: 0%   Standardized Score (AM-PAC Scale): 61.14   CMS Modifier (G-Code): CH    FUNCTIONAL ABILITY STATUS  Gait Assessment   Functional Mobility/Gait Assessment  Gait Assistance: Independent  Distance (ft): 100  Assistive Device: None  Pattern: Within Functional Limits    Skilled Therapy Provided     Bed Mobility:  Supine to sit: IND   Sit to supine: IND     Transfer Mobility:  Sit to stand: IND   Stand to sit: IND  Gait = IND    Therapist's comments: pt educated on role of IP PT services, PT evaluation purpose, PT POC, PT goals, device recommendations, dc recommendations, and importance of mobility while hospitalized.   -pt greeted supine. No LOB or mobility impairments noted throughout evaluation. Able to dual task via head turns and conversation when ambulating without change in mechanics.     Exercise/Education  Provided:  Bed mobility  Body mechanics  Energy conservation  Functional activity tolerated  Posture  Transfer training    Patient End of Session: In bed;Needs met;Call light within reach;RN aware of session/findings;All patient questions and concerns addressed;Hospital anti-slip socks    Patient Evaluation Complexity Level:  History Low - no personal factors and/or co-morbidities   Examination of body systems Moderate - addressing a total of 3 or more elements   Clinical Presentation Low- Stable   Clinical Decision Making Low Complexity       PT Session Time: 23 minutes  Therapeutic Exercise: 8 minutes

## 2025-02-23 NOTE — PLAN OF CARE
Assumed patient care at 0730. Alert and oriented x4. Room air. Sinus bradycardia on tele. Cardiac electrolyte protocol. Regular diet. Accu check QID. Continent x2. Patient denies any pain at this time. Up ad john. Neuro check Q4. No deficits. NIH daily. Right AC infusing 0.9% @75ml/hr. All safety precautions are in place at this time.         Problem: PAIN - ADULT  Goal: Verbalizes/displays adequate comfort level or patient's stated pain goal  Description: INTERVENTIONS:  - Encourage pt to monitor pain and request assistance  - Assess pain using appropriate pain scale  - Administer analgesics based on type and severity of pain and evaluate response  - Implement non-pharmacological measures as appropriate and evaluate response  - Consider cultural and social influences on pain and pain management  - Manage/alleviate anxiety  - Utilize distraction and/or relaxation techniques  - Monitor for opioid side effects  - Notify MD/LIP if interventions unsuccessful or patient reports new pain  - Anticipate increased pain with activity and pre-medicate as appropriate  Outcome: Progressing

## 2025-02-23 NOTE — ED QUICK NOTES
Orders for admission, patient is aware of plan and ready to go upstairs. Any questions, please call ED RN Serge at extension [phone].     Patient Covid vaccination status: Fully vaccinated     COVID Test Ordered in ED: None    COVID Suspicion at Admission: N/A    Running Infusions:  None    Mental Status/LOC at time of transport: A/Ox4    Other pertinent information:   CIWA score: N/A   NIH score:  1

## 2025-02-23 NOTE — SLP NOTE
ADULT SWALLOWING EVALUATION    ASSESSMENT    ASSESSMENT/OVERALL IMPRESSION:  Pt seen at bedside this AM for bedside swallow evaluation. Speech consulted per CVA protocol. Pt cooperative, pleasant, and alert. Per RN documentation and report, pt passed nursing dysphagia screening and no problems identified during PO intake at bedside. Pt admitted due to concerns for stroke. CT negative. MRI negative. Pt cooperative, pleasant, and alert. Pt alone in room without family/caregivers present. Pt able to follow commands and answer questions appropriately. Pt denied changes to swallow function since admission or current symptoms associated with dysphagia.     Volitional cough and swallow present, and strong. Oral motor abilities WFL at this time. Trial thin liquids via cup, self presented, with no overt s/s of aspiration. Hyolaryngeal elevation and swallow initiation functional per palpation. Trial hard solids and pureed with no overt s/s of aspiration. Functional mastication with no oral residue observed.     Recommend regular diet and thin liquids. No further dysphagia follow up indicated given baseline abilities and functional swallow response noted during session.     Pt declined participation in cog/comm given baseline abilities and feeling \"much better\".     Chang Assessment of Swallow Function Score: No abnormality detected    RECOMMENDATIONS   Diet Recommendations - Solids: Regular  Diet Recommendations - Liquids: Thin Liquids       Medication Administration Recommendations: No restrictions  Treatment Plan/Recommendations: No further inpatient SLP service warranted    HISTORY   MEDICAL HISTORY  Reason for Referral: Stroke protocol    Problem List  Principal Problem:    Transient ischemic attack (TIA)      Past Medical History  Past Medical History:    ANXIETY    Anxiety state    Back pain    L5 and S1 disc deteriation    Depression    Esophageal reflux    Fibromyalgia    Headache disorder    Cluster H/A    High blood  pressure    High cholesterol    Hx of diseases NEC    h/o pleurisy    Hx of diseases NEC    h/o chronic pelvic pain    Hypogonadism in male    Irritable bowel syndrome    Other and unspecified hyperlipidemia    Pneumonia due to organism    Typhoid fever    Unspecified sleep apnea    DME update:  Premier CPAP 11CWP          Diet Prior to Admission: Regular;Thin liquids       Patient/Family Goals: To go home    SWALLOWING HISTORY  Current Diet Consistency: Regular;Thin liquids  Dysphagia History: Pt seen for outpatient speech therapy services with [facility] in October 2020 due to neurological concerns for PD at the time. [facility] revealed mild pharyngeal phase dysphagia. Regular diet and thin liquids recommended.  Imaging Results: MRI 2/23/25:   Impression   CONCLUSION:  No acute intracranial pathology.       OBJECTIVE   ORAL MOTOR EXAMINATION  Dentition: Natural;Functional  Symmetry: Reduced right facial  Strength: Within Functional Limits  Tone: Within Functional Limits  Range of Motion: Within Functional Limits  Rate of Motion: Within Functional Limits    Voice Quality: Clear  Respiratory Status: Unlabored  Consistencies Trialed: Thin liquids;Puree;Hard solid  Method of Presentation: Self presentation;Spoon;Cup;Straw;Single sips;Consecutive swallows  Patient Positioned: Upright    Oral Phase of Swallow: Within Functional Limits       Pharyngeal Phase of Swallow: Within Functional Limits           (Please note: Silent aspiration cannot be evaluated clinically. Videofluoroscopic Swallow Study is required to rule-out silent aspiration.)    Esophageal Phase of Swallow: No complaints consistent with possible esophageal involvement      FOLLOW UP  Treatment Plan/Recommendations: No further inpatient SLP service warranted     Follow Up Needed (Documentation Required): No       Thank you for your referral.   If you have any questions, please contact JOHN Mccarthy

## 2025-02-24 ENCOUNTER — TELEPHONE (OUTPATIENT)
Dept: NEUROLOGY | Facility: CLINIC | Age: 55
End: 2025-02-24

## 2025-02-24 NOTE — PROGRESS NOTES
NURSING DISCHARGE NOTE    Discharged Home via Ambulatory.  Accompanied by Family member  Belongings Taken by patient/family.    CT resulted, paged MD to confirm. Per MD, ok for patient to discharge. AVS paperwork printed and given to patient. PIV removed by this RN. Telemetry monitoring removed. Patient dressed by self, ambulatory. Left unit in good condition with family member.

## 2025-02-24 NOTE — TELEPHONE ENCOUNTER
TIA CLINIC SCREENING    Situation  Date of ED visit/TIA diagnosis: 2/22/25    Time of discharge from ED: 2/23/25 2014    Is patient currently admitted?  No If YES - TIA Clinic Appointment not required.    Delete Background and Assessment sections and skip to Recommendation.     Background  Does patient already see an [facility] neurologist? No  Name: If YES - TIA Clinic Appointment not required.    Route completed message on to patient's neurologist for follow up recommendation.       Assessment  Patient's current anti-platelet therapy: NONE       Has 2D Echo with bubble test been done? No  Date     Recommendation  Is TIA Clinic Appointment indicated?  Unsure - routing encounter to Stroke Director for recommendation   If YES Contact patient to schedule appointment NO LATER THAN 48 HOURS AFTER ED DISCHARGE.    If message left for patient, document message and route encounter to [facility] to follow up.  If patient declines appointment within 24-48 hours, document that and find an appointment suitable to patient's schedule.  If patient declines appointment, document that and reason for decline.   If UNSURE  Route encounter to clinic provider for recommendation.   If NO  indicate reason and sign encounter.       TIA APPOINTMENT STATUS: Routing to provider for further recommendation.

## 2025-02-24 NOTE — DISCHARGE SUMMARY
General Medicine Discharge Summary     Patient ID:  Prem Waddell  55 year old  2/11/1970    Admit date: 2/22/2025    Discharge date and time: 2/23/2025    Attending Physician: Philomena Fernandez DO     Consults: IP CONSULT TO NEUROLOGY  IP CONSULT TO HOSPITALIST  IP CONSULT TO SOCIAL WORK    Primary Care Physician: Kalia Parker MD     Reason for admission: Word finding difficulty    Risk For Readmission: Low    Discharge Diagnoses: Transient ischemic attack (TIA) [G45.9]  See Additional Discharge Diagnoses in Hospital Course    Discharged Condition: good    Follow-up with labs/images appointments: PCP, neurology    Exam   Gen: Alert, no acute distress  Heent: Normocephalic, atraumatic, neck supple, EOMI, PERRLA  Pulm: Lungs CTAB, normal respiratory effort  CV:  Regular rate and rhythm, no murmurs/rubs/gallops  Abd: Soft, nontender, nondistended, bowel sounds present  Extremities: No peripheral edema, no clubbing, pulses intact   Skin: No rashes or lesions  Neuro: AOx3, no focal neurologic deficits, CN II-XII grossly intact  Psych: appropriate mood and affect    Hospital Course: 55-year-old male with a past medical history of anxiety, back pain, depression, GERD, fibromyalgia, hypertension, hyperlipidemia, DICK who presented to the hospital due to word finding difficulty.  Patient notes that his symptoms started around 8 AM yesterday and was at work, says that he was reading interview questions and was having a hard time getting words out even though he knew what he wanted to say.  Came to the ER for further evaluation, CBC/CMP unremarkable, troponin normal, urinalysis unremarkable.  CT head negative.  Admitted to the hospital for stroke workup with neurology on consult.  Patient underwent brain MRI which was negative for stroke, also underwent CT angio head/neck which did not show any critical stenosis or vessel occlusion.  Suspect that symptoms are in the setting of complex migraines and less likely TIA per  neurology.  Cleared for discharge from neurology standpoint, patient is to follow-up with PCP and outpatient neurologist in the next 1-2 weeks.    operative Procedures:      Imaging: MRI BRAIN (CPT=70551)    Result Date: 2/23/2025  CONCLUSION:  No acute intracranial pathology.   LOCATION:  [facility]   Dictated by (CST): Wisam Hamlin MD on 2/23/2025 at 8:22 AM     Finalized by (CST): Wisam Hamlin MD on 2/23/2025 at 8:26 AM       CT BRAIN OR HEAD (CPT=70450)    Result Date: 2/22/2025  CONCLUSION:  1. No acute intracranial hemorrhage. 2. If an acute infarct is of high clinical concern, recommend MRI for further evaluation.    LOCATION:  [facility]   Dictated by (CST): Carol Castle MD on 2/22/2025 at 4:12 PM     Finalized by (CST): Carol Castle MD on 2/22/2025 at 4:14 PM          Disposition: home      Home Medication Changes:     Med list     Medication List        CONTINUE taking these medications      alfuzosin ER 10 MG Tb24  Commonly known as: Uroxatral ER     baclofen 10 MG Tabs  Commonly known as: Lioresal  TAKE 1 TABLET BY MOUTH AT BEDTIME     carBAMazepine 200 MG Tabs  Commonly known as: TEGretol  Take 1.5 tablets (300 mg total) by mouth 2 (two) times daily.     cloNIDine 0.1 MG Tabs  Commonly known as: Catapres  1/2 to 1 tab by mouth twice daily for anxiety     EMGALITY SC     Magnesium 200 MG Tabs     Melatonin 10 MG Caps     Minocycline HCl 100 MG Tabs  Commonly known as: DYNACIN  One po every day     mirtazapine 7.5 MG Tabs  Commonly known as: Remeron  Take 1 tablet (7.5 mg total) by mouth nightly.     miSOPROStol 200 MCG Tabs  Commonly known as: Cytotec     pantoprazole 40 MG Tbec  Commonly known as: Protonix  Take 1 tablet (40 mg total) by mouth before breakfast.     Rimegepant Sulfate 75 MG Tbdp     SUMAtriptan Succinate 6 MG/0.5ML Soaj  Commonly known as: IMITREX     Tadalafil 20 MG Tabs  Take 1 tablet (20 mg total) by mouth daily as needed for Erectile Dysfunction.     Testosterone Cypionate 200 MG/ML Soln      tiZANidine 4 MG Tabs  Commonly known as: Zanaflex  Take 1 tablet (4 mg total) by mouth every 8 (eight) hours as needed.     triamcinolone 0.1 % Crea  Commonly known as: Kenalog  APPLY TOPICALLY TO ECZEMA ON TRUNK TWICE DAILY FOR UP TO 14 DAYS AS NEEDED            STOP taking these medications      ALPRAZolam ER 1 MG Tb24  Commonly known as: XANAX XR     clindamycin 300 MG Caps  Commonly known as: Cleocin     ibuprofen 600 MG Tabs  Commonly known as: Motrin     methylphenidate 10 MG Tabs  Commonly known as: Ritalin     predniSONE 20 MG Tabs  Commonly known as: Deltasone            ASK your doctor about these medications      ergocalciferol 1.25 MG (42230 UT) Caps  Commonly known as: Vitamin D2  One po weekly     ubrogepant 50 MG Tabs  Commonly known as: Ubrelvy  1 as needed for migraine, repeat one in 2 hrs Up to 4x/day              FU      DC instructions:      I reconciled current and discharge medications on day of discharge, discussed changes with patient and noted changes above.       Total Time Coordinating Care: 37 minutes.     Patient had opportunity to ask questions and state understanding and agreement with therapeutic plan as outlined.    DO Radha Roldan [facility]  Contact via [employer]

## 2025-02-25 ENCOUNTER — PATIENT OUTREACH (OUTPATIENT)
Dept: CASE MANAGEMENT | Age: 55
End: 2025-02-25

## 2025-02-25 NOTE — PROGRESS NOTES
Hospital follow up.    No stroke/TIA per stroke navigator.    Kalia Parker MD  Internal Medicine  [address] [facility]  [address]  [place] [address]  [phone]  Thursday 2/27 @2    Michael Kam MD  Neurology  [address] [facility] [address]  [address]th [place] [address]  [phone]  Patient will contact the office to schedule.    Confirmed with patient.    Closing encounter.

## (undated) DEVICE — OCCLUSIVE GAUZE STRIP OVERWRAP,3% BISMUTH TRIBROMOPHENATE IN PETROLATUM BLEND: Brand: XEROFORM

## (undated) DEVICE — LOWER EXTREMITY CDS-LF: Brand: MEDLINE INDUSTRIES, INC.

## (undated) DEVICE — KENDALL SCD EXPRESS SLEEVES, KNEE LENGTH, MEDIUM: Brand: KENDALL SCD

## (undated) DEVICE — Device

## (undated) DEVICE — REM POLYHESIVE ADULT PATIENT RETURN ELECTRODE: Brand: VALLEYLAB

## (undated) DEVICE — SOL  .9 1000ML BTL

## (undated) DEVICE — SUTURE VICRYL 0 CP-2

## (undated) DEVICE — PREMIUM WET SKIN PREP TRAY: Brand: MEDLINE INDUSTRIES, INC.

## (undated) DEVICE — STOCKINETTE HYDROMED 6\" 706044

## (undated) NOTE — ED AVS SNAPSHOT
Ramonita Faria   MRN: [id number]    Department:  THE [facility] Emergency Department in [place]   Date of Visit:  12/8/2019           Disclosure     Insurance plans vary and the physician(s) referred by the ER may not be covered by your plan.  Please conta tell this physician (or your personal doctor if your instructions are to return to your personal doctor) about any new or lasting problems. The primary care or specialist physician will see patients referred from the [facility] Emergency Department.  Ulises Stephens

## (undated) NOTE — ED AVS SNAPSHOT
Dexter Stauffer   MRN: [id number]    Department:  [facility] Emergency Department   Date of Visit:  11/3/2019           Disclosure     Insurance plans vary and the physician(s) referred by the ER may not be covered by your plan.  Please contact yo tell this physician (or your personal doctor if your instructions are to return to your personal doctor) about any new or lasting problems. The primary care or specialist physician will see patients referred from the [facility] Emergency Department.  Severo Pastures

## (undated) NOTE — LETTER
05/21/19    Rohit Yu      To Whom It May Concern:     This letter has been written at the patient's request. The above patient was seen at [facility] for treatment of a medical condition from 5/20/2019 to 5/21/2019    The patient may return to

## (undated) NOTE — LETTER
05/21/19    Amanda Tran      To Whom It May Concern:     This letter has been written at the patient's request. The above patient was seen at [facility] for treatment of a medical condition from 5/20/2019 to 5/21/2019    The patient may return to

## (undated) NOTE — MR AVS SNAPSHOT
[facility]  [address] [place] [id number]  [phone]               Thank you for choosing us for your health care visit with Lena Taylor PT.   We are glad to serve you and happy to provide you with this summ baclofen 10 MG Tabs   Take 1 tablet (10 mg total) by mouth 2 (two) times daily as needed (spasms).    Commonly known as:  LIORESAL           Desonide 0.05 % Crea   Apply to AA BID   Commonly known as:  DESOWEN           Dicyclomine HCl 20 MG Tabs   Take 20

## (undated) NOTE — LETTER
Patient Name: Rock Taylor  YOB: 1970          MRN number:  [id number]  Date:  8/28/2018  Referring Physician:  Cony Craig    Discharge Summary  Initial Functional Outcome Score 42/100  Final Functional Outcome Score 56/100  Number o Goals: 12 visits  Patient will demonstrate independence in performing home exercise program.  Patient will report full cervical ROM without pain limitation  Patient will be able to sit at ergonomic work desk with computer with 0-2/10 pain  Patient will Relda Filler

## (undated) NOTE — ED AVS SNAPSHOT
Ramonita Faria   MRN: [id number]    Department:  [facility] [place]   Date of Visit:  1/17/2020           Disclosure     Insurance plans vary and the physician(s) referred by the ER may not be covered by your plan.  Please conta tell this physician (or your personal doctor if your instructions are to return to your personal doctor) about any new or lasting problems. The primary care or specialist physician will see patients referred from the [facility] Emergency Department.  Joyce Lopes

## (undated) NOTE — ED AVS SNAPSHOT
Desiree Hill   MRN: [id number]    Department:  [facility] Emergency Department   Date of Visit:  4/24/2019           Disclosure     Insurance plans vary and the physician(s) referred by the ER may not be covered by your plan.  Please contact yo tell this physician (or your personal doctor if your instructions are to return to your personal doctor) about any new or lasting problems. The primary care or specialist physician will see patients referred from the [facility] Emergency Department.  Arthor Bernheim

## (undated) NOTE — LETTER
Yolanda Hamilton [address] [address]  [place], [address] [facility]    Consent for Operation  Date: __________________                                Time: _______________    1.  I authorize the performance upon Tres Araujo the following operation:  Procedur procedure has been videotaped, the surgeon will obtain the original videotape. The hospital will not be responsible for storage or maintenance of this tape.   7. For the purpose of advancing medical education, I consent to the admittance of observers to the STATEMENTS REQUIRING INSERTION OR COMPLETION WERE FILLED IN.     Signature of Patient:   ___________________________    When the patient is a minor or mentally incompetent to give consent:  Signature of person authorized to consent for patient: ____________ supplements, and pills I can buy without a prescription (including street drugs/illegal medications). Failure to inform my anesthesiologist about these medicines may increase my risk of anesthetic complications. iv.  If I am allergic to anything or have ha Anesthesiologist Signature     Date   Time  I have discussed the procedure and information above with the patient (or patient’s representative) and answered their questions. The patient or their representative has agreed to have anesthesia services.     ___

## (undated) NOTE — LETTER
Patient Name: Sabina Palmer  YOB: 1970          MRN number:  [id number]  Date:  11/2/2020  Referring Physician:  Syl Aguila     Dear Dr. Nancie Gomez,    Thank you for your referral. This letter is to inform you of initial findings from [facility] • Hx of diseases NEC     h/o pleurisy   • Hx of diseases NEC     h/o chronic pelvic pain   • Hypogonadism in male    • Irritable bowel syndrome    • Other and unspecified hyperlipidemia    • Pneumonia due to organism    • Typhoid fever 1994   • Unspecified •  Clindamycin Phosphate 1 % External Foam, Apply 1-2 times a day, Disp: 100 g, Rfl: 2    •  metFORMIN HCl 500 MG Oral Tab, Take 500 mg by mouth 2 (two) times daily with meals. , Disp: , Rfl:     •  ubrogepant (UBRELVY) 50 MG Oral Tab, 1 as needed for migra History of Recent: No recent respiratory illness reported during assessment; however, confirmation will be made at next session. Patient describes prior level of function as WFL.      ASSESSMENT  SLP Diagnosis: Oropharyngeal Dysphagia (R13.12)  Clinical Pharyngeal Phase of Swallow impaired   Other : residue suspected per patient's complaint that puree and hard solids consistencies were sticking to back of tongue and in throat. (Please note: Silent aspiration cannot be evaluated clinically.  Nancy Vitale Rehab Potential:good for stated goals given regular attendance and compliance with HEP.     Patient/Family/Caregiver was advised of these findings, precautions, and treatment options and has agreed to actively participate in planning and for this course of

## (undated) NOTE — ED AVS SNAPSHOT
Michael Timmons   MRN: [id number]    Department:  [facility] Emergency Department in [place]   Date of Visit:  4/6/2018           Disclosure     Insurance plans vary and the physician(s) referred by the ER may not be covered by your plan.  Please contac tell this physician (or your personal doctor if your instructions are to return to your personal doctor) about any new or lasting problems. The primary care or specialist physician will see patients referred from the [facility] Emergency Department.  Kely Oconnor

## (undated) NOTE — Clinical Note
Patient Name: Desiree Hill  YOB: 1970          MRN number:  [id number]  Date:  1/12/2017  Referring Physician:  Shane Coley    Progress /Discharge Summary  Pt has attended 8, cancelled 0, and no shown 0 visits in Physical Therapy. Bilat cervical rotation 70 deg for easier head turning. --> met but not consistently  Able to tolerate 45 min drive to work without stopping and able to immediately transfer from car upon arrival at work.    --> in progress  Able to sleep through the night

## (undated) NOTE — MR AVS SNAPSHOT
[facility]  [address] [place], [place] [id number] [phone]               Thank you for choosing us for your health care visit with Lena Taylor PT.   We are glad to serve you and happy to provide you with this summ MAGNESIUM CARBONATE OR   Take 250 mg by mouth. Meloxicam 15 MG Tabs   Take 1 tablet (15 mg total) by mouth daily.            Pantoprazole Sodium 40 MG Tbec   TAKE 1 TABLET BY MOUTH EVERY MORNING BEFORE BREAKFAST   Commonly known as:  PROTONIX

## (undated) NOTE — ED AVS SNAPSHOT
Ramonita Faria   MRN: [id number]    Department:  [facility] [place]   Date of Visit:  3/14/2020           Disclosure     Insurance plans vary and the physician(s) referred by the ER may not be covered by your plan.  Please conta tell this physician (or your personal doctor if your instructions are to return to your personal doctor) about any new or lasting problems. The primary care or specialist physician will see patients referred from the [facility] Emergency Department.  Rosanne Fields

## (undated) NOTE — ED AVS SNAPSHOT
Jeovany Rosas   MRN: [id number]    Department:  [facility] Emergency Department   Date of Visit:  2/23/2019           Disclosure     Insurance plans vary and the physician(s) referred by the ER may not be covered by your plan.  Please contact yo tell this physician (or your personal doctor if your instructions are to return to your personal doctor) about any new or lasting problems. The primary care or specialist physician will see patients referred from the [facility] Emergency Department.  Francisco Colon

## (undated) NOTE — ED AVS SNAPSHOT
Tres Araujo   MRN: [id number]    Department:  THE [facility] Emergency Department in [place]   Date of Visit:  1/5/2020           Disclosure     Insurance plans vary and the physician(s) referred by the ER may not be covered by your plan.  Please contac tell this physician (or your personal doctor if your instructions are to return to your personal doctor) about any new or lasting problems. The primary care or specialist physician will see patients referred from the [facility] Emergency Department.  Marilin Swartz

## (undated) NOTE — ED AVS SNAPSHOT
Jyoti Horan   MRN: [id number]    Department:  [facility] Emergency Department   Date of Visit:  6/9/2019           Disclosure     Insurance plans vary and the physician(s) referred by the ER may not be covered by your plan.  Please contact you tell this physician (or your personal doctor if your instructions are to return to your personal doctor) about any new or lasting problems. The primary care or specialist physician will see patients referred from the [facility] Emergency Department.  Salvadore Skiff

## (undated) NOTE — MR AVS SNAPSHOT
[facility]  [address] [place], [place] [id number] [phone]               Thank you for choosing us for your health care visit with Lena Taylor PT.   We are glad to serve you and happy to provide you with this summ Dicyclomine HCl 20 MG Tabs   Take 20 mg by mouth every 4 (four) hours as needed. Commonly known as:  BENTYL           DULoxetine HCl 30 MG Cpep   Take 1 capsule (30 mg total) by mouth daily.    Commonly known as:  CYMBALTA           MAGNESIUM CARBONATE O